# Patient Record
Sex: FEMALE | Race: WHITE | HISPANIC OR LATINO
[De-identification: names, ages, dates, MRNs, and addresses within clinical notes are randomized per-mention and may not be internally consistent; named-entity substitution may affect disease eponyms.]

---

## 2017-03-30 ENCOUNTER — TRANSCRIPTION ENCOUNTER (OUTPATIENT)
Age: 42
End: 2017-03-30

## 2017-11-26 ENCOUNTER — TRANSCRIPTION ENCOUNTER (OUTPATIENT)
Age: 42
End: 2017-11-26

## 2017-12-18 ENCOUNTER — APPOINTMENT (OUTPATIENT)
Dept: SPINE | Facility: CLINIC | Age: 42
End: 2017-12-18
Payer: COMMERCIAL

## 2017-12-18 VITALS
BODY MASS INDEX: 27.32 KG/M2 | DIASTOLIC BLOOD PRESSURE: 80 MMHG | SYSTOLIC BLOOD PRESSURE: 119 MMHG | HEART RATE: 62 BPM | HEIGHT: 66 IN | WEIGHT: 170 LBS

## 2017-12-18 DIAGNOSIS — Z78.9 OTHER SPECIFIED HEALTH STATUS: ICD-10-CM

## 2017-12-18 DIAGNOSIS — Z87.2 PERSONAL HISTORY OF DISEASES OF THE SKIN AND SUBCUTANEOUS TISSUE: ICD-10-CM

## 2017-12-18 DIAGNOSIS — M54.5 LOW BACK PAIN: ICD-10-CM

## 2017-12-18 DIAGNOSIS — Z87.09 PERSONAL HISTORY OF OTHER DISEASES OF THE RESPIRATORY SYSTEM: ICD-10-CM

## 2017-12-18 DIAGNOSIS — Z87.19 PERSONAL HISTORY OF OTHER DISEASES OF THE DIGESTIVE SYSTEM: ICD-10-CM

## 2017-12-18 PROCEDURE — 99203 OFFICE O/P NEW LOW 30 MIN: CPT

## 2018-01-11 ENCOUNTER — TRANSCRIPTION ENCOUNTER (OUTPATIENT)
Age: 43
End: 2018-01-11

## 2018-05-19 ENCOUNTER — TRANSCRIPTION ENCOUNTER (OUTPATIENT)
Age: 43
End: 2018-05-19

## 2018-11-07 ENCOUNTER — TRANSCRIPTION ENCOUNTER (OUTPATIENT)
Age: 43
End: 2018-11-07

## 2024-02-07 ENCOUNTER — NON-APPOINTMENT (OUTPATIENT)
Age: 49
End: 2024-02-07

## 2024-06-11 PROBLEM — Z00.00 ENCOUNTER FOR PREVENTIVE HEALTH EXAMINATION: Status: ACTIVE | Noted: 2024-06-11

## 2024-10-02 ENCOUNTER — APPOINTMENT (OUTPATIENT)
Dept: ENDOCRINOLOGY | Facility: CLINIC | Age: 49
End: 2024-10-02

## 2025-02-05 ENCOUNTER — INPATIENT (INPATIENT)
Facility: HOSPITAL | Age: 50
LOS: 3 days | Discharge: HOME | DRG: 863 | End: 2025-02-09
Attending: STUDENT IN AN ORGANIZED HEALTH CARE EDUCATION/TRAINING PROGRAM | Admitting: STUDENT IN AN ORGANIZED HEALTH CARE EDUCATION/TRAINING PROGRAM
Payer: COMMERCIAL

## 2025-02-05 VITALS
HEART RATE: 95 BPM | WEIGHT: 173.06 LBS | SYSTOLIC BLOOD PRESSURE: 117 MMHG | HEIGHT: 66 IN | OXYGEN SATURATION: 98 % | DIASTOLIC BLOOD PRESSURE: 60 MMHG | RESPIRATION RATE: 20 BRPM | TEMPERATURE: 98 F

## 2025-02-05 DIAGNOSIS — N61.1 ABSCESS OF THE BREAST AND NIPPLE: ICD-10-CM

## 2025-02-05 LAB
ALBUMIN SERPL ELPH-MCNC: 4.1 G/DL — SIGNIFICANT CHANGE UP (ref 3.3–5)
ALP SERPL-CCNC: 96 U/L — SIGNIFICANT CHANGE UP (ref 40–120)
ALT FLD-CCNC: 12 U/L — SIGNIFICANT CHANGE UP (ref 10–45)
ANION GAP SERPL CALC-SCNC: 12 MMOL/L — SIGNIFICANT CHANGE UP (ref 5–17)
AST SERPL-CCNC: 18 U/L — SIGNIFICANT CHANGE UP (ref 10–40)
BASOPHILS # BLD AUTO: 0.03 K/UL — SIGNIFICANT CHANGE UP (ref 0–0.2)
BASOPHILS NFR BLD AUTO: 0.5 % — SIGNIFICANT CHANGE UP (ref 0–2)
BILIRUB SERPL-MCNC: 0.2 MG/DL — SIGNIFICANT CHANGE UP (ref 0.2–1.2)
BUN SERPL-MCNC: 8 MG/DL — SIGNIFICANT CHANGE UP (ref 7–23)
CALCIUM SERPL-MCNC: 9.7 MG/DL — SIGNIFICANT CHANGE UP (ref 8.4–10.5)
CHLORIDE SERPL-SCNC: 100 MMOL/L — SIGNIFICANT CHANGE UP (ref 96–108)
CO2 SERPL-SCNC: 25 MMOL/L — SIGNIFICANT CHANGE UP (ref 22–31)
CREAT SERPL-MCNC: 0.55 MG/DL — SIGNIFICANT CHANGE UP (ref 0.5–1.3)
EGFR: 112 ML/MIN/1.73M2 — SIGNIFICANT CHANGE UP
EOSINOPHIL # BLD AUTO: 0.18 K/UL — SIGNIFICANT CHANGE UP (ref 0–0.5)
EOSINOPHIL NFR BLD AUTO: 3.1 % — SIGNIFICANT CHANGE UP (ref 0–6)
GLUCOSE SERPL-MCNC: 103 MG/DL — HIGH (ref 70–99)
HCT VFR BLD CALC: 39.2 % — SIGNIFICANT CHANGE UP (ref 34.5–45)
HGB BLD-MCNC: 12.2 G/DL — SIGNIFICANT CHANGE UP (ref 11.5–15.5)
IMM GRANULOCYTES NFR BLD AUTO: 0.3 % — SIGNIFICANT CHANGE UP (ref 0–0.9)
LYMPHOCYTES # BLD AUTO: 1.74 K/UL — SIGNIFICANT CHANGE UP (ref 1–3.3)
LYMPHOCYTES # BLD AUTO: 30.2 % — SIGNIFICANT CHANGE UP (ref 13–44)
MCHC RBC-ENTMCNC: 28.4 PG — SIGNIFICANT CHANGE UP (ref 27–34)
MCHC RBC-ENTMCNC: 31.1 G/DL — LOW (ref 32–36)
MCV RBC AUTO: 91.2 FL — SIGNIFICANT CHANGE UP (ref 80–100)
MONOCYTES # BLD AUTO: 0.71 K/UL — SIGNIFICANT CHANGE UP (ref 0–0.9)
MONOCYTES NFR BLD AUTO: 12.3 % — SIGNIFICANT CHANGE UP (ref 2–14)
NEUTROPHILS # BLD AUTO: 3.08 K/UL — SIGNIFICANT CHANGE UP (ref 1.8–7.4)
NEUTROPHILS NFR BLD AUTO: 53.6 % — SIGNIFICANT CHANGE UP (ref 43–77)
NRBC # BLD: 0 /100 WBCS — SIGNIFICANT CHANGE UP (ref 0–0)
NRBC BLD-RTO: 0 /100 WBCS — SIGNIFICANT CHANGE UP (ref 0–0)
PLATELET # BLD AUTO: 423 K/UL — HIGH (ref 150–400)
POTASSIUM SERPL-MCNC: 4 MMOL/L — SIGNIFICANT CHANGE UP (ref 3.5–5.3)
POTASSIUM SERPL-SCNC: 4 MMOL/L — SIGNIFICANT CHANGE UP (ref 3.5–5.3)
PROT SERPL-MCNC: 7.5 G/DL — SIGNIFICANT CHANGE UP (ref 6–8.3)
RBC # BLD: 4.3 M/UL — SIGNIFICANT CHANGE UP (ref 3.8–5.2)
RBC # FLD: 13 % — SIGNIFICANT CHANGE UP (ref 10.3–14.5)
SODIUM SERPL-SCNC: 137 MMOL/L — SIGNIFICANT CHANGE UP (ref 135–145)
WBC # BLD: 5.76 K/UL — SIGNIFICANT CHANGE UP (ref 3.8–10.5)
WBC # FLD AUTO: 5.76 K/UL — SIGNIFICANT CHANGE UP (ref 3.8–10.5)

## 2025-02-05 PROCEDURE — 99223 1ST HOSP IP/OBS HIGH 75: CPT

## 2025-02-05 PROCEDURE — 99285 EMERGENCY DEPT VISIT HI MDM: CPT

## 2025-02-05 PROCEDURE — 76642 ULTRASOUND BREAST LIMITED: CPT | Mod: 26,50

## 2025-02-05 RX ORDER — PIPERACILLIN SODIUM AND TAZOBACTAM SODIUM 2; 250 G/50ML; MG/50ML
3.38 INJECTION, POWDER, FOR SOLUTION INTRAVENOUS ONCE
Refills: 0 | Status: COMPLETED | OUTPATIENT
Start: 2025-02-05 | End: 2025-02-05

## 2025-02-05 RX ORDER — BACTERIOSTATIC SODIUM CHLORIDE 0.9 %
1000 VIAL (ML) INJECTION ONCE
Refills: 0 | Status: COMPLETED | OUTPATIENT
Start: 2025-02-05 | End: 2025-02-05

## 2025-02-05 RX ORDER — VANCOMYCIN HYDROCHLORIDE 50 MG/ML
1000 KIT ORAL ONCE
Refills: 0 | Status: COMPLETED | OUTPATIENT
Start: 2025-02-05 | End: 2025-02-05

## 2025-02-05 RX ORDER — KETOROLAC TROMETHAMINE 10 MG
15 TABLET ORAL ONCE
Refills: 0 | Status: DISCONTINUED | OUTPATIENT
Start: 2025-02-05 | End: 2025-02-05

## 2025-02-05 RX ADMIN — PIPERACILLIN SODIUM AND TAZOBACTAM SODIUM 200 GRAM(S): 2; 250 INJECTION, POWDER, FOR SOLUTION INTRAVENOUS at 21:29

## 2025-02-05 RX ADMIN — Medication 1000 MILLILITER(S): at 21:22

## 2025-02-05 RX ADMIN — VANCOMYCIN HYDROCHLORIDE 250 MILLIGRAM(S): KIT at 22:32

## 2025-02-05 RX ADMIN — Medication 15 MILLIGRAM(S): at 21:23

## 2025-02-05 NOTE — ED PROVIDER NOTE - SKIN, MLM
+ small blusters at 8 o clock position under left breast with induration and some small amount of granulation tissue.  Right breasts with similar finding but smaller

## 2025-02-05 NOTE — H&P ADULT - ASSESSMENT
49 year old female hx of ulcerative colitis in remission, psoriatic arthritis, chronic back pain from degenerative disc disease, spinal stenosis, s/p breast reduction and repair of rectus diasti in [place] on 12/19 presenting with concerns for wound dehiscence. Found to have a complex fluid collection in L breast c/f abscess. Pt admitted for further management.

## 2025-02-05 NOTE — H&P ADULT - NSHPREVIEWOFSYSTEMS_GEN_ALL_CORE
Review of Systems:   CONSTITUTIONAL: No fever, weight loss  EYES: No eye pain, visual disturbances, or discharge  ENMT:  No difficulty hearing, tinnitus, vertigo; No sinus or throat pain  RESPIRATORY: No SOB. No cough, wheezing, chills or hemoptysis  CARDIOVASCULAR: No chest pain, palpitations, dizziness, or leg swelling  GASTROINTESTINAL: No abdominal or epigastric pain. No nausea, vomiting, or hematemesis; No diarrhea or constipation. No melena or hematochezia.  GENITOURINARY: No dysuria, frequency, hematuria, or incontinence  NEUROLOGICAL: No headaches, memory loss, loss of strength, numbness, or tremors  SKIN: open wounds under L breast . Small wound under R breast  MUSCULOSKELETAL: No joint pain or swelling; No muscle, back pain  PSYCHIATRIC: No depression, anxiety, mood swings, or difficulty sleeping  HEME/LYMPH: No easy bruising, or bleeding gums

## 2025-02-05 NOTE — ED ADULT NURSE NOTE - OBJECTIVE STATEMENT
48YO female s/p mammoplasty 9 weeks ago in [place] comes to the ED with c/o discharge ar surgical site. Pt states noticing small blisters approx five days ago under left breast at surgical site with yellowish discharge. pt is tender at site and has a small opening. Site currently has no discharge. pt is A&OX4, respirations are even and nonlabored, radial pulses are strong and equal. Pt denies fevers, chills. n/v/d. Pt placed in position of comfort. Bed in lowest position, wheels locked, appropriate side rails raised. Pt denies needs at this time.

## 2025-02-05 NOTE — H&P ADULT - NSHPPHYSICALEXAM_GEN_ALL_CORE
Vital Signs Last 24 Hrs  T(C): 36.8 (06 Feb 2025 00:51), Max: 36.9 (05 Feb 2025 21:31)  T(F): 98.3 (06 Feb 2025 00:51), Max: 98.5 (05 Feb 2025 21:31)  HR: 82 (06 Feb 2025 00:51) (72 - 95)  BP: 104/67 (06 Feb 2025 00:51) (104/67 - 123/78)  BP(mean): 93 (05 Feb 2025 21:31) (93 - 93)  RR: 20 (06 Feb 2025 00:51) (18 - 20)  SpO2: 98% (06 Feb 2025 00:51) (98% - 98%)    Parameters below as of 06 Feb 2025 00:51  Patient On (Oxygen Delivery Method): room air        CONSTITUTIONAL: Well-groomed, in no apparent distress  EYES: No conjunctival or scleral injection, non-icteric; PERRLA and symmetric  ENMT: No external nasal lesions; nasal mucosa not inflamed; oral mucosa with moist membranes  RESPIRATORY: Breathing comfortably; lungs CTA without wheeze/rhonchi/rales  CARDIOVASCULAR: +S1S2, RRR, no M/G/R; no lower extremity edema  BREAST: L breast with single ulcerated lesion around 6:00 position, opening of surgical incision around 5:00 position.   GASTROINTESTINAL: No palpable masses or tenderness, +BS throughout, no rebound/guarding  MUSCULOSKELETAL:  No digital clubbing or cyanosis; no paraspinal tenderness; no malalignment of extremities  SKIN: No rashes or ulcers noted; no subcutaneous nodules or induration palpable  NEUROLOGIC: CN grossly intact; sensation intact in LEs b/l to light touch; normal strength and tone  PSYCHIATRIC: A+O x 3; mood and affect appropriate; appropriate insight and judgment

## 2025-02-05 NOTE — ED ADULT TRIAGE NOTE - CHIEF COMPLAINT QUOTE
Mammoplasty procedure 7 weeks ago. Pt noticed on saturday there were several blisters with discharge to surgical area. Surgeon advised pt to come to ED for evaluation. Pt denies fever chils

## 2025-02-05 NOTE — ED PROVIDER NOTE - CLINICAL SUMMARY MEDICAL DECISION MAKING FREE TEXT BOX
49 F s/p breast reduction sx performed on 12/19 presents to the ED w/ posisble wound dehiscence of the surgical site, pt states that 5 days ago, she developed blisters on the L lower breast and then developed on the R side, pt states sx started on saturday went to  and then was started on doxycycline and levquin, pt states she has been compliant w/ her medications, she has no fevers, no chills, no nausea no vomiting, she states that she has been applying mupricin to the area, pt states that she went to  and has been in contact w/ her surgeon, and sending photos, pt states that yesterday her surgeon saw the area and was concerned for wound dehiscence, pt reports no purulent drainage. on exam, pt has an area approx 1 cm on the lower L breast and a second area on the l lower breast approx 2 cm in size, at the 5 oclock position, pt w a 1 cm medial aspect on the R side approx w/ no purulent drainge, plan for sonogram and antibiotics and reassessment, will discuss w/ surgery, obtain ultrasound to assess for wound collection

## 2025-02-05 NOTE — H&P ADULT - PROBLEM SELECTOR PLAN 2
Hx of chronic pain from herniated disc, pinched nerve, spinal stenosis  - Tylenol and oxycodone prn for symptom management

## 2025-02-05 NOTE — H&P ADULT - HISTORY OF PRESENT ILLNESS
49 year old female s/p breast reduction and repair of rectus diasti in [place] on 12/19 presenting with concerns for wound dehiscence.  49 year old female hx of ulcerative colitis in remission, psoriatic arthritis, chronic back pain from degenerative disc disease, spinal stenosis, s/p breast reduction and repair of rectus diasti in [place] on 12/19 presenting with concerns for wound dehiscence. Pt went for breast reduction surgery to help her chronic back pain. Post-operatively for the first six weeks, pt reported no complications. Wounds appeared to be healing well. However around Friday 1/31, pt started experiencing some pain around incision site. On Saturday, pt took a shower after which she noted reopening or her surgical site with some drainage of clear yellowish fluid from her L breast. She also notes smaller opening in her R breast as well. Denies any bleeding, purulent discharge, fevers. She has pain and soreness in her breasts for which she takes tylenol. She has been in contact with her surgeon from [place] who recommended for her to come to ED for further evaluation. Pt also went to urgent care for evaluation and was prescribed doxycyline and levaquin for which she has been taking without significant improvement. In ED, pt was evaluated by Plastics and Surgery. Breast US showed complex fluid collection in L breast c/f abscess along with smaller collection in R breast. Pt admitted for further management.

## 2025-02-05 NOTE — ED PROVIDER NOTE - OBJECTIVE STATEMENT
49-year-old female status post mammoplasty 9 weeks ago in [place] presenting to the emergency department with likely dehiscence of her surgical site.  Patient reports that 5 days ago she noticed some small blisters more prominent on the left lower breast however present on the right as well.  Patient immediately was seen in urgent care started on doxycycline and Levaquin as well as mupirocin.  Patient has been in contact with her surgeon and been sending pictures every day.  Patient became concerned about the appearance of the rash today and recommended she come to the emergency department.  No fevers no chills had cultures performed at the urgent care on day 1of symptoms which displayed no growth according to her.  No other complaints at this time

## 2025-02-05 NOTE — H&P ADULT - NSICDXPASTMEDICALHX_GEN_ALL_CORE_FT
PAST MEDICAL HISTORY:  Chronic back pain     History of herniated intervertebral disc     Psoriatic arthritis     Ulcerative colitis

## 2025-02-05 NOTE — ED PROVIDER NOTE - PROGRESS NOTE DETAILS
Spoke with plastic surgeon David Arreola went over case and likely appearance of a left-sided breast abscess.  He does not believe that there is any acute surgical intervention for him at this time and this would be better suited to be seen and evaluated by interventional radiology tomorrow.  Agrees with continuing IV antibiotics and he will come consult in the morning as well.  Julia

## 2025-02-05 NOTE — CONSULT NOTE ADULT - SUBJECTIVE AND OBJECTIVE BOX
*** SURGERY CONSULT NOTE    Consulting Team: Breast Surgery     Patient: KENTRELL MAY , 49y ([birth year])Female   MRN: [id number]  Location: [facility] ED  Visit: 02-05-25 Emergency  Date: 02-05-25 @ 19:41      HPI: 49F PMH UC (not taking immunosupp/immunologics), PSA, s/p recent breast reduction suergey and repair of rectus diasti in [place] 7 weeks ago now presenting to the ED with c/f wound dehiscence. Patient reports 5 days ago she noticed some small blisters along inframmamry incision. She was sending daily images to her surgeon in [place], developed a punctate portion of drainage along incision with area of firbinous tissue b/l. Patient went to urgent care and had incision cultured (reportedly negative) and was started on doxycycline and Levaquin as well as mupirocin. She continued to develop eythema and some pain along incision despite abx with an area c/f wound dehiscence so was prompted by OP surgeon to go to ED. Patient otherwise denies subjective f/c, CP, SOB, n/v, or liz pus. In the ED AVSS, labs showed no leukocytosis.       PAST MEDICAL HISTORY:      PAST SURGICAL HISTORY:      MEDICATIONS:      ALLERGIES:  No Known Allergies      VITALS & I/Os:  Vital Signs Last 24 Hrs  T(C): 36.6 (05 Feb 2025 15:16), Max: 36.6 (05 Feb 2025 15:16)  T(F): 97.8 (05 Feb 2025 15:16), Max: 97.8 (05 Feb 2025 15:16)  HR: 95 (05 Feb 2025 15:16) (95 - 95)  BP: 117/60 (05 Feb 2025 15:16) (117/60 - 117/60)  BP(mean): --  RR: 20 (05 Feb 2025 15:16) (20 - 20)  SpO2: 98% (05 Feb 2025 15:16) (98% - 98%)    Parameters below as of 05 Feb 2025 15:16  Patient On (Oxygen Delivery Method): room air        I&O's Summary      PHYSICAL EXAM:  General Appearance: no acute distress, NTND   Chest: airway intact, non-labored breathing  Breast: inframammary incisions with erythema, R incision with focal area of fibrinous tissue but no palpable fluctuance or TTP, L incision with two small areas of wound breakdown and overlying fibrinous tissue, no liz pus/crepitus or fluctuance    LABS:                        12.2   5.76  )-----------( 423      ( 05 Feb 2025 18:57 )             39.2     02-05    137  |  100  |  8   ----------------------------<  103[H]  4.0   |  25  |  0.55    Ca    9.7      05 Feb 2025 18:57    TPro  7.5  /  Alb  4.1  /  TBili  0.2  /  DBili  x   /  AST  18  /  ALT  12  /  AlkPhos  96  02-05    Lactate:              Urinalysis Basic - ( 05 Feb 2025 18:57 )    Color: x / Appearance: x / SG: x / pH: x  Gluc: 103 mg/dL / Ketone: x  / Bili: x / Urobili: x   Blood: x / Protein: x / Nitrite: x   Leuk Esterase: x / RBC: x / WBC x   Sq Epi: x / Non Sq Epi: x / Bacteria: x               *** SURGERY CONSULT NOTE    Consulting Team: Breast Surgery     Patient: KENTRELL MAY , 49y ([birth year])Female   MRN: [id number]  Location: [facility] ED  Visit: 02-05-25 Emergency  Date: 02-05-25 @ 19:41      HPI: 49F PMH UC (not taking immunosupp/immunologics), PSA, s/p recent breast reduction surgery and repair of rectus diasti in [place] 7 weeks ago now presenting to the ED with c/f wound dehiscence. Patient reports 5 days ago she noticed some small blisters along inframammary incision. She was sending daily images to her surgeon in [place], developed a punctate portion of drainage along incision with area of fibrinous tissue b/l. Patient went to urgent care and had incision cultured (reportedly negative) and was started on doxycycline and Levaquin as well as mupirocin. She continued to develop erythema and some pain along incision despite abx with an area c/f wound dehiscence so was prompted by OP surgeon to go to ED. Patient otherwise denies subjective f/c, CP, SOB, n/v, or liz pus. In the ED AVSS, labs showed no leukocytosis.       PAST MEDICAL HISTORY:      PAST SURGICAL HISTORY:      MEDICATIONS:      ALLERGIES:  No Known Allergies      VITALS & I/Os:  Vital Signs Last 24 Hrs  T(C): 36.6 (05 Feb 2025 15:16), Max: 36.6 (05 Feb 2025 15:16)  T(F): 97.8 (05 Feb 2025 15:16), Max: 97.8 (05 Feb 2025 15:16)  HR: 95 (05 Feb 2025 15:16) (95 - 95)  BP: 117/60 (05 Feb 2025 15:16) (117/60 - 117/60)  BP(mean): --  RR: 20 (05 Feb 2025 15:16) (20 - 20)  SpO2: 98% (05 Feb 2025 15:16) (98% - 98%)    Parameters below as of 05 Feb 2025 15:16  Patient On (Oxygen Delivery Method): room air        I&O's Summary      PHYSICAL EXAM:  General Appearance: no acute distress, NTND   Chest: airway intact, non-labored breathing  Breast: inframammary incisions with erythema, R incision with focal area of fibrinous tissue but no palpable fluctuance or TTP, L incision with two small areas of wound breakdown and overlying fibrinous tissue, no liz pus/crepitus or fluctuance    LABS:                        12.2   5.76  )-----------( 423      ( 05 Feb 2025 18:57 )             39.2     02-05    137  |  100  |  8   ----------------------------<  103[H]  4.0   |  25  |  0.55    Ca    9.7      05 Feb 2025 18:57    TPro  7.5  /  Alb  4.1  /  TBili  0.2  /  DBili  x   /  AST  18  /  ALT  12  /  AlkPhos  96  02-05    Lactate:              Urinalysis Basic - ( 05 Feb 2025 18:57 )    Color: x / Appearance: x / SG: x / pH: x  Gluc: 103 mg/dL / Ketone: x  / Bili: x / Urobili: x   Blood: x / Protein: x / Nitrite: x   Leuk Esterase: x / RBC: x / WBC x   Sq Epi: x / Non Sq Epi: x / Bacteria: x

## 2025-02-05 NOTE — H&P ADULT - NSHPLABSRESULTS_GEN_ALL_CORE
02-05    137  |  100  |  8   ----------------------------<  103[H]  4.0   |  25  |  0.55    Ca    9.7      05 Feb 2025 18:57    TPro  7.5  /  Alb  4.1  /  TBili  0.2  /  DBili  x   /  AST  18  /  ALT  12  /  AlkPhos  96  02-05                    Urinalysis Basic - ( 05 Feb 2025 18:57 )    Color: x / Appearance: x / SG: x / pH: x  Gluc: 103 mg/dL / Ketone: x  / Bili: x / Urobili: x   Blood: x / Protein: x / Nitrite: x   Leuk Esterase: x / RBC: x / WBC x   Sq Epi: x / Non Sq Epi: x / Bacteria: x                              12.2   5.76  )-----------( 423      ( 05 Feb 2025 18:57 )             39.2 02-05    137  |  100  |  8   ----------------------------<  103[H]  4.0   |  25  |  0.55    Ca    9.7      05 Feb 2025 18:57    TPro  7.5  /  Alb  4.1  /  TBili  0.2  /  DBili  x   /  AST  18  /  ALT  12  /  AlkPhos  96  02-05                  Urinalysis Basic - ( 05 Feb 2025 18:57 )    Color: x / Appearance: x / SG: x / pH: x  Gluc: 103 mg/dL / Ketone: x  / Bili: x / Urobili: x   Blood: x / Protein: x / Nitrite: x   Leuk Esterase: x / RBC: x / WBC x   Sq Epi: x / Non Sq Epi: x / Bacteria: x                            12.2   5.76  )-----------( 423      ( 05 Feb 2025 18:57 )             39.2    IMAGING    < from: US Breast Limited, Bilateral (02.05.25 @ 20:24) >    IMPRESSION:  Complex fluid collection with surrounding vascularity of the left breast   5:00 position along the incision site, suspicious for abscess.    No sonographic abnormality in the region of the left lower outer   breast/underarm region.    Small subcentimeter mildly complex fluid collection of the right lower   outer breast/underarm area without associated vascularity which may   represent small postoperative hematoma/seroma or abscess.    If symptoms do not resolve clinically, additional imaging in a dedicated   breast imaging center should be performed .    < end of copied text >

## 2025-02-05 NOTE — H&P ADULT - PROBLEM SELECTOR PLAN 1
Complex fluid collection noted on breast US, larger on L. Will need to rule out infection.   - Plastics and surgery consult recs appreciated  - IR consult for possible drainage of abscess  - Send fluid for culture  - Will continue vancomycin and zosyn for empiric coverage  - Monitor for fevers  - Wound care

## 2025-02-05 NOTE — CONSULT NOTE ADULT - ASSESSMENT
49F PMH UC (not taking immunosupp/immunologics), PSA, s/p recent breast reduction suergey and repair of rectus diasti in [place] 7 weeks ago now presenting to the ED with c/f wound dehiscence.     Rec:  - Defer to plastics for wound recs, patient had elective b/l breast reduction surgery with OP plastics  - Agree with abx  - Can repeat wound culture and obtain b/l breast US  - Discussed with Dr. Barney Munoz Surgery [id number]  Jonathan Solomon MD (PGY2)  49F PMH UC (not taking immunosupp/immunologics), PSA, s/p recent breast reduction surgery and repair of rectus diasti in [place] 7 weeks ago now presenting to the ED with c/f wound dehiscence.     Rec:  - Defer to plastics for wound recs, patient had elective b/l breast reduction surgery with OP plastics  - Agree with abx  - Can repeat wound culture and obtain b/l breast US  - Discussed with Dr. Barney Munoz Surgery [id number]  Jonathan Solomon MD (PGY2)  49F PMH UC (not taking immunosupp/immunologics), PSA, s/p recent breast reduction surgery and repair of rectus diasti in [place] 7 weeks ago now presenting to the ED with c/f wound dehiscence.     Rec:  - Defer to plastics for wound recs, patient had elective b/l breast reduction surgery with OP plastics  - Agree with abx  - Can repeat wound culture and obtain b/l breast US  - breast surgery to sign off  - Discussed with Dr. Barney Munoz Surgery [id number]  Jonathan Solomon MD (PGY2)

## 2025-02-06 DIAGNOSIS — N61.1 ABSCESS OF THE BREAST AND NIPPLE: ICD-10-CM

## 2025-02-06 DIAGNOSIS — Z98.890 OTHER SPECIFIED POSTPROCEDURAL STATES: Chronic | ICD-10-CM

## 2025-02-06 DIAGNOSIS — Z29.9 ENCOUNTER FOR PROPHYLACTIC MEASURES, UNSPECIFIED: ICD-10-CM

## 2025-02-06 LAB
GRAM STN FLD: SIGNIFICANT CHANGE UP
SPECIMEN SOURCE: SIGNIFICANT CHANGE UP

## 2025-02-06 PROCEDURE — G0545: CPT

## 2025-02-06 PROCEDURE — 99232 SBSQ HOSP IP/OBS MODERATE 35: CPT

## 2025-02-06 PROCEDURE — 99222 1ST HOSP IP/OBS MODERATE 55: CPT

## 2025-02-06 RX ORDER — OXYCODONE HYDROCHLORIDE 30 MG/1
5 TABLET ORAL EVERY 6 HOURS
Refills: 0 | Status: DISCONTINUED | OUTPATIENT
Start: 2025-02-06 | End: 2025-02-09

## 2025-02-06 RX ORDER — OXYCODONE HYDROCHLORIDE 30 MG/1
2.5 TABLET ORAL EVERY 6 HOURS
Refills: 0 | Status: DISCONTINUED | OUTPATIENT
Start: 2025-02-06 | End: 2025-02-09

## 2025-02-06 RX ORDER — ACETAMINOPHEN 160 MG/5ML
650 SUSPENSION ORAL EVERY 6 HOURS
Refills: 0 | Status: DISCONTINUED | OUTPATIENT
Start: 2025-02-06 | End: 2025-02-09

## 2025-02-06 RX ORDER — OXYCODONE HYDROCHLORIDE 30 MG/1
5 TABLET ORAL EVERY 8 HOURS
Refills: 0 | Status: DISCONTINUED | OUTPATIENT
Start: 2025-02-06 | End: 2025-02-06

## 2025-02-06 RX ORDER — OXYCODONE HYDROCHLORIDE 30 MG/1
2.5 TABLET ORAL EVERY 8 HOURS
Refills: 0 | Status: DISCONTINUED | OUTPATIENT
Start: 2025-02-06 | End: 2025-02-06

## 2025-02-06 RX ORDER — VANCOMYCIN HYDROCHLORIDE 50 MG/ML
1250 KIT ORAL EVERY 12 HOURS
Refills: 0 | Status: DISCONTINUED | OUTPATIENT
Start: 2025-02-06 | End: 2025-02-09

## 2025-02-06 RX ORDER — PIPERACILLIN SODIUM AND TAZOBACTAM SODIUM 2; 250 G/50ML; MG/50ML
3.38 INJECTION, POWDER, FOR SOLUTION INTRAVENOUS EVERY 8 HOURS
Refills: 0 | Status: DISCONTINUED | OUTPATIENT
Start: 2025-02-06 | End: 2025-02-09

## 2025-02-06 RX ADMIN — PIPERACILLIN SODIUM AND TAZOBACTAM SODIUM 25 GRAM(S): 2; 250 INJECTION, POWDER, FOR SOLUTION INTRAVENOUS at 17:14

## 2025-02-06 RX ADMIN — OXYCODONE HYDROCHLORIDE 5 MILLIGRAM(S): 30 TABLET ORAL at 11:13

## 2025-02-06 RX ADMIN — VANCOMYCIN HYDROCHLORIDE 166.67 MILLIGRAM(S): KIT at 12:33

## 2025-02-06 RX ADMIN — OXYCODONE HYDROCHLORIDE 5 MILLIGRAM(S): 30 TABLET ORAL at 10:13

## 2025-02-06 RX ADMIN — OXYCODONE HYDROCHLORIDE 5 MILLIGRAM(S): 30 TABLET ORAL at 22:55

## 2025-02-06 RX ADMIN — Medication 1 APPLICATION(S): at 20:52

## 2025-02-06 RX ADMIN — VANCOMYCIN HYDROCHLORIDE 166.67 MILLIGRAM(S): KIT at 23:51

## 2025-02-06 RX ADMIN — PIPERACILLIN SODIUM AND TAZOBACTAM SODIUM 25 GRAM(S): 2; 250 INJECTION, POWDER, FOR SOLUTION INTRAVENOUS at 06:11

## 2025-02-06 RX ADMIN — OXYCODONE HYDROCHLORIDE 2.5 MILLIGRAM(S): 30 TABLET ORAL at 03:11

## 2025-02-06 RX ADMIN — OXYCODONE HYDROCHLORIDE 5 MILLIGRAM(S): 30 TABLET ORAL at 21:55

## 2025-02-06 NOTE — PATIENT PROFILE ADULT - VISION (WITH CORRECTIVE LENSES IF THE PATIENT USUALLY WEARS THEM):
The patient is a 13y Female complaining of shortness of breath. Normal vision: sees adequately in most situations; can see medication labels, newsprint

## 2025-02-06 NOTE — CONSULT NOTE ADULT - ASSESSMENT
49 f with  ulcerative colitis in remission, psoriatic arthritis but not on any immunosuppresion, chronic back pain from degenerative disc disease, spinal stenosis, s/p breast reduction and repair of rectus diasti in [place] on 12/19, now p/w concerns for wound dehiscence on b/l breast and had some purulent drainage from them, was started on doxy and levo 4 days PTA  afebrile, no WBC  u/s showed Complex fluid collection with surrounding vascularity of the left breast 5:00 position along the incision site, suspicious for abscess. No sonographic abnormality in the region of the left lower outer breast/underarm region. Small subcentimeter mildly complex fluid collection of the right lower outer breast/underarm area without associated vascularity which may represent small postoperative hematoma/seroma or abscess.  wound swab cx negative      b/l wound dehiscence and purulent drainage 7 weeks after breast reduction, u/s with b/l small complex fluid collection suspicious for abscess  cx negative and also timing (7 weeks after) concerning for mycobacterial infection as well but was also started on doxy, levo 4 days PTA    * plan for IR aspiration today, please send bacterial and AFB cx  * c/w vanco and zosyn for now and will adjust as per cultures    The above assessment and plan was discussed with the primary team    Cherelle Hartmann MD  contact on teams  After 5pm and on weekends call [phone]

## 2025-02-06 NOTE — CONSULT NOTE ADULT - SUBJECTIVE AND OBJECTIVE BOX
Interventional Radiology    Evaluate for Procedure: Bilateral breast collection aspiration    HPI: 49 year old female hx of ulcerative colitis in remission, psoriatic arthritis, chronic back pain from degenerative disc disease, spinal stenosis, s/p breast reduction and repair of rectus diasti in [place] on  presenting with concerns for wound dehiscence. Pt went for breast reduction surgery to help her chronic back pain. Post-operatively for the first six weeks, pt reported no complications. Wounds appeared to be healing well. However around , pt started experiencing some pain around incision site. On Saturday, pt took a shower after which she noted reopening or her surgical site with some drainage of clear yellowish fluid from her L breast. She also notes smaller opening in her R breast as well. Denies any bleeding, purulent discharge, fevers. She has pain and soreness in her breasts for which she takes tylenol. She has been in contact with her surgeon from [place] who recommended for her to come to ED for further evaluation. Pt also went to urgent care for evaluation and was prescribed doxycyline and levaquin for which she has been taking without significant improvement. In ED, pt was evaluated by Plastics and Surgery. Breast US showed complex fluid collection in L breast c/f abscess along with smaller collection in R breast. Pt admitted for further management.     Allergies: No Known Allergies    Medications (Abx/Cardiac/Anticoagulation/Blood Products)  piperacillin/tazobactam IVPB..: 25 mL/Hr IV Intermittent ( @ 06:11)  piperacillin/tazobactam IVPB...: 200 mL/Hr IV Intermittent ( @ 21:29)  vancomycin  IVPB.: 250 mL/Hr IV Intermittent ( @ 22:32)    Data:  167.6  78.5  T(C): 36.7  HR: 77  BP: 102/66  RR: 20  SpO2: 98%    -WBC 5.76 / HgB 12.2 / Hct 39.2 / Plt 423  -Na 137 / Cl 100 / BUN 8 / Glucose 103  -K 4.0 / CO2 25 / Cr 0.55  -ALT 12 / Alk Phos 96 / T.Bili 0.2    Radiology: < from: US Breast Limited, Bilateral (25 @ 20:24) >    ACC: [id number] EXAM:  US BREAST LIMITED BI   ORDERED BY: DEREJE ANGEL     PROCEDURE DATE:  2025          INTERPRETATION:  CLINICAL INFORMATION: Mastitis. Rule out abscess.    TECHNIQUE: Limited sonographic evaluation of the bilateral breasts breast   was performed targeted to the area of pain in the 5:00 position of the   left breast as well as left lower outer breast/underarm area and right   lower outer breast/underarm. Evaluation was performed by a technologist.   This study wasperformed exclusively for the purpose of excluding the   presence of abscess in the clinical setting of mastitis.    FINDINGS: In the 5:00 position of the left breast at the site of incision   there is a 2.4 x 0.5 x 1.2 cm complex fluid collection with surrounding   increased vascularity suspicious for an abscess.    In the region of the left lower outer breast underarm area there is no   sonographic abnormality.    In the right lower outer breast/underarm. There is small complex fluid   collection measuring 0.7 x 0.3 x 0.6 cm. There is no associated increased   vascularity.    IMPRESSION:  Complex fluid collection with surrounding vascularity of the left breast   5:00 position along the incision site, suspicious for abscess.    No sonographic abnormality in the region of the left lower outer   breast/underarm region.    Small subcentimeter mildly complex fluid collection of the right lower   outer breast/underarm area without associated vascularity which may   represent small postoperative hematoma/seroma or abscess.    If symptoms do not resolve clinically, additional imaging in a dedicated   breast imaging center should be performed .    --- End of Report ---      MIKALA DIAZ M.D., Attending Radiologist  This document has been electronically signed. 2025  8:45PM    < end of copied text >      Assessment/Plan: 49 year old female hx of ulcerative colitis in remission, psoriatic arthritis, chronic back pain from degenerative disc disease, spinal stenosis, s/p breast reduction and repair of rectus diasti in [place] on  presenting with concerns for wound dehiscence.Breast US showed complex fluid collection in L breast c/f abscess along with smaller collection in R breast. Pt admitted for further management. IR consulted for B/L breast collection aspiration.     **IR consulted in progress, will review with IR attending**        Any questions or concerns regarding above please reach out to IR:   -Available on [employer] teams  -During working hours (7a-5p): call [phone]  -Emergent issues after 5pm: page: [phone]  -Non-emergent consults: Please place a [facility] order "IR Consult" with an appropriate callback number  -Scheduling questions: [phone]  -Clinic/Outpatient [phone]   Interventional Radiology    Evaluate for Procedure: Bilateral breast collection aspiration    HPI: 49 year old female hx of ulcerative colitis in remission, psoriatic arthritis, chronic back pain from degenerative disc disease, spinal stenosis, s/p breast reduction and repair of rectus diasti in [place] on  presenting with concerns for wound dehiscence. Pt went for breast reduction surgery to help her chronic back pain. Post-operatively for the first six weeks, pt reported no complications. Wounds appeared to be healing well. However around , pt started experiencing some pain around incision site. On Saturday, pt took a shower after which she noted reopening or her surgical site with some drainage of clear yellowish fluid from her L breast. She also notes smaller opening in her R breast as well. Denies any bleeding, purulent discharge, fevers. She has pain and soreness in her breasts for which she takes tylenol. She has been in contact with her surgeon from [place] who recommended for her to come to ED for further evaluation. Pt also went to urgent care for evaluation and was prescribed doxycyline and levaquin for which she has been taking without significant improvement. In ED, pt was evaluated by Plastics and Surgery. Breast US showed complex fluid collection in L breast c/f abscess along with smaller collection in R breast. Pt admitted for further management.     Allergies: No Known Allergies    Medications (Abx/Cardiac/Anticoagulation/Blood Products)  piperacillin/tazobactam IVPB..: 25 mL/Hr IV Intermittent ( @ 06:11)  piperacillin/tazobactam IVPB...: 200 mL/Hr IV Intermittent ( @ 21:29)  vancomycin  IVPB.: 250 mL/Hr IV Intermittent ( @ 22:32)    Data:  167.6  78.5  T(C): 36.7  HR: 77  BP: 102/66  RR: 20  SpO2: 98%    -WBC 5.76 / HgB 12.2 / Hct 39.2 / Plt 423  -Na 137 / Cl 100 / BUN 8 / Glucose 103  -K 4.0 / CO2 25 / Cr 0.55  -ALT 12 / Alk Phos 96 / T.Bili 0.2    Radiology: < from: US Breast Limited, Bilateral (25 @ 20:24) >    ACC: [id number] EXAM:  US BREAST LIMITED BI   ORDERED BY: DEREJE ANGEL     PROCEDURE DATE:  2025          INTERPRETATION:  CLINICAL INFORMATION: Mastitis. Rule out abscess.    TECHNIQUE: Limited sonographic evaluation of the bilateral breasts breast   was performed targeted to the area of pain in the 5:00 position of the   left breast as well as left lower outer breast/underarm area and right   lower outer breast/underarm. Evaluation was performed by a technologist.   This study wasperformed exclusively for the purpose of excluding the   presence of abscess in the clinical setting of mastitis.    FINDINGS: In the 5:00 position of the left breast at the site of incision   there is a 2.4 x 0.5 x 1.2 cm complex fluid collection with surrounding   increased vascularity suspicious for an abscess.    In the region of the left lower outer breast underarm area there is no   sonographic abnormality.    In the right lower outer breast/underarm. There is small complex fluid   collection measuring 0.7 x 0.3 x 0.6 cm. There is no associated increased   vascularity.    IMPRESSION:  Complex fluid collection with surrounding vascularity of the left breast   5:00 position along the incision site, suspicious for abscess.    No sonographic abnormality in the region of the left lower outer   breast/underarm region.    Small subcentimeter mildly complex fluid collection of the right lower   outer breast/underarm area without associated vascularity which may   represent small postoperative hematoma/seroma or abscess.    If symptoms do not resolve clinically, additional imaging in a dedicated   breast imaging center should be performed .    --- End of Report ---      MIKALA DIAZ M.D., Attending Radiologist  This document has been electronically signed. 2025  8:45PM    < end of copied text >      Assessment/Plan: 49 year old female hx of ulcerative colitis in remission, psoriatic arthritis, chronic back pain from degenerative disc disease, spinal stenosis, s/p breast reduction and repair of rectus diasti in [place] on  presenting with concerns for wound dehiscence. Breast US showed complex fluid collection in L breast c/f abscess along with smaller collection in R breast. Pt admitted for further management. IR consulted for B/L breast collection aspiration.       - case reviewed and approved for left breast aspiration under local anesthesia for tomorrow . Right collection not amenable for drainage as too small.   - please place IR procedure order under  MD [facility]  - STAT labs in AM (cbc,coags, bmp, maintain activeT&S)  - Pt currently not on AC, if plans to start, please contact IR to coordinate holding pre-procedure/resumption post-procedure.   - No need to be NPO as will be done under local anesthesia     Any questions or concerns regarding above please reach out to IR:   -Available on [employer] teams  -During working hours (7a-5p): call [phone]  -Emergent issues after 5pm: page: [phone]  -Non-emergent consults: Please place a [facility] order "IR Consult" with an appropriate callback number  -Scheduling questions: [phone]  -Clinic/Outpatient [phone]

## 2025-02-06 NOTE — PATIENT PROFILE ADULT - STATED REASON FOR ADMISSION
-- Message is from the [facility]--    Reason for Call: PATIENT IS RETURNING A CALL FROM THE OFFICE,PLEASE CALL  [phone]    Caller Information       Type Contact Phone    04/24/2019 03:28 PM Phone (Incoming) Marie Biswas (Self) [phone] (H)          Alternative phone number:     Turnaround time given to caller:   \"This message will be sent to [state Provider's name]. The clinical team will fulfill your request as soon as they review your message.\"     Abscess on breast

## 2025-02-06 NOTE — CONSULT NOTE ADULT - SUBJECTIVE AND OBJECTIVE BOX
HPI:  49 year old female hx of ulcerative colitis in remission, psoriatic arthritis, chronic back pain from degenerative disc disease, spinal stenosis, s/p breast reduction and repair of rectus diasti in [place] on 12/19 presenting with concerns for wound dehiscence. Pt went for breast reduction surgery to help her chronic back pain. Post-operatively for the first six weeks, pt reported no complications. Wounds appeared to be healing well. However around Friday 1/31, pt started experiencing some pain around incision site. On Saturday, pt took a shower after which she noted reopening or her surgical site with some drainage of clear yellowish fluid from her L breast. She also notes smaller opening in her R breast as well. Denies any bleeding, purulent discharge, fevers. She has pain and soreness in her breasts for which she takes tylenol. She has been in contact with her surgeon from [place] who recommended for her to come to ED for further evaluation. Pt also went to urgent care for evaluation and was prescribed doxycyline and levaquin for which she has been taking without significant improvement. In ED, pt was evaluated by Plastics and Surgery. Breast US showed complex fluid collection in L breast c/f abscess along with smaller collection in R breast. Pt admitted for further management.  (05 Feb 2025 23:55)      PAST MEDICAL & SURGICAL HISTORY:  Ulcerative colitis      Psoriatic arthritis      Chronic back pain      History of herniated intervertebral disc      Status post breast reduction          Allergies    No Known Allergies    Intolerances        ANTIMICROBIALS:  piperacillin/tazobactam IVPB.. 3.375 every 8 hours  vancomycin  IVPB 1250 every 12 hours      OTHER MEDS:  acetaminophen     Tablet .. 650 milliGRAM(s) Oral every 6 hours PRN  bacitracin   Ointment 1 Application(s) Topical daily  oxyCODONE    IR 2.5 milliGRAM(s) Oral every 6 hours PRN  oxyCODONE    IR 5 milliGRAM(s) Oral every 6 hours PRN      SOCIAL HISTORY:  , lives with family  no smoking, alcohol or drug abuse  had her surgery in [place] and came back here recently    FAMILY HISTORY:  FHx: colon cancer        ROS:  Unobtainable because:   All other systems negative     Constitutional: no fever, no chills, no weight loss, no night sweats  Eye: no eye pain, no redness, no vision changes  ENT:  no sore throat, no rhinorrhea  Cardiovascular:  no chest pain, no palpitation  Respiratory:  no SOB, no cough  GI:  no abd pain, no vomiting, no diarrhea  urinary: no dysuria, no hematuria, no flank pain  : no vaginal discharge or bleeding  musculoskeletal:  no joint pain, no joint swelling  skin:  no rash  neurology:  no headache, no seizure, no change in mental status  psych: no anxiety, no depression     Physical Exam:    General:    NAD, non toxic  Head: atraumatic, normocephalic  Eyes: normal sclera and conjunctiva  ENT:   no oropharyngeal lesions, no LAD, neck supple  Cardio:    regular S1,S2  Respiratory:   clear b/l, no wheezing  abd:   soft, BS +, not tender  :     no CVAT, no suprapubic tenderness, no mariano  Musculoskeletal : no joint swelling, no edema  Skin:    small opening on breast incision, no purulence now, L wound slightly bigger  vascular: no phlebitis  Neurologic:     no focal deficits  psych: normal affect      Drug Dosing Weight  Height (cm): 167.6 (06 Feb 2025 08:41)  Weight (kg): 78.5 (05 Feb 2025 15:16)  BMI (kg/m2): 27.9 (06 Feb 2025 08:41)  BSA (m2): 1.88 (06 Feb 2025 08:41)    Vital Signs Last 24 Hrs  T(F): 98 (02-06-25 @ 08:41), Max: 98.5 (02-05-25 @ 21:31)    Vital Signs Last 24 Hrs  HR: 88 (02-06-25 @ 08:41) (72 - 95)  BP: 98/63 (02-06-25 @ 08:41) (98/63 - 123/78)  RR: 18 (02-06-25 @ 08:41)  SpO2: 96% (02-06-25 @ 08:41) (96% - 98%)  Wt(kg): --                          12.2   5.76  )-----------( 423      ( 05 Feb 2025 18:57 )             39.2       02-05    137  |  100  |  8   ----------------------------<  103[H]  4.0   |  25  |  0.55    Ca    9.7      05 Feb 2025 18:57    TPro  7.5  /  Alb  4.1  /  TBili  0.2  /  DBili  x   /  AST  18  /  ALT  12  /  AlkPhos  96  02-05      Urinalysis Basic - ( 05 Feb 2025 18:57 )    Color: x / Appearance: x / SG: x / pH: x  Gluc: 103 mg/dL / Ketone: x  / Bili: x / Urobili: x   Blood: x / Protein: x / Nitrite: x   Leuk Esterase: x / RBC: x / WBC x   Sq Epi: x / Non Sq Epi: x / Bacteria: x        MICROBIOLOGY:  v  .Abscess  02-05-25 --  --    No polymorphonuclear cells seen per low power field  No organisms seen per oil power field                  RADIOLOGY:    Images independently visualized and reviewed personally,  findings as below  < from: US Breast Limited, Bilateral (02.05.25 @ 20:24) >  IMPRESSION:  Complex fluid collection with surrounding vascularity of the left breast   5:00 position along the incision site, suspicious for abscess.    No sonographic abnormality in the region of the left lower outer   breast/underarm region.    Small subcentimeter mildly complex fluid collection of the right lower   outer breast/underarm area without associated vascularity which may   represent small postoperative hematoma/seroma or abscess.    If symptoms do not resolve clinically, additional imaging in a dedicated   breast imaging center should be performed .      < end of copied text >

## 2025-02-07 LAB
ANION GAP SERPL CALC-SCNC: 13 MMOL/L — SIGNIFICANT CHANGE UP (ref 5–17)
APTT BLD: 30 SEC — SIGNIFICANT CHANGE UP (ref 24.5–35.6)
B PERT IGG+IGM PNL SER: ABNORMAL
B PERT IGG+IGM PNL SER: ABNORMAL
BLD GP AB SCN SERPL QL: NEGATIVE — SIGNIFICANT CHANGE UP
BUN SERPL-MCNC: 12 MG/DL — SIGNIFICANT CHANGE UP (ref 7–23)
CALCIUM SERPL-MCNC: 9.2 MG/DL — SIGNIFICANT CHANGE UP (ref 8.4–10.5)
CHLORIDE SERPL-SCNC: 101 MMOL/L — SIGNIFICANT CHANGE UP (ref 96–108)
CO2 SERPL-SCNC: 23 MMOL/L — SIGNIFICANT CHANGE UP (ref 22–31)
COLOR FLD: ABNORMAL
COLOR FLD: SIGNIFICANT CHANGE UP
CREAT SERPL-MCNC: 0.66 MG/DL — SIGNIFICANT CHANGE UP (ref 0.5–1.3)
EGFR: 107 ML/MIN/1.73M2 — SIGNIFICANT CHANGE UP
EOSINOPHIL # FLD: 1 % — SIGNIFICANT CHANGE UP
FLUID INTAKE SUBSTANCE CLASS: SIGNIFICANT CHANGE UP
FLUID INTAKE SUBSTANCE CLASS: SIGNIFICANT CHANGE UP
FOLATE+VIT B12 SERBLD-IMP: 1 % — SIGNIFICANT CHANGE UP
GLUCOSE SERPL-MCNC: 101 MG/DL — HIGH (ref 70–99)
HCT VFR BLD CALC: 34.1 % — LOW (ref 34.5–45)
HGB BLD-MCNC: 10.9 G/DL — LOW (ref 11.5–15.5)
INR BLD: 1.07 RATIO — SIGNIFICANT CHANGE UP (ref 0.85–1.16)
LYMPHOCYTES # FLD: 4 % — SIGNIFICANT CHANGE UP
LYMPHOCYTES # FLD: 4 % — SIGNIFICANT CHANGE UP
MCHC RBC-ENTMCNC: 29.4 PG — SIGNIFICANT CHANGE UP (ref 27–34)
MCHC RBC-ENTMCNC: 32 G/DL — SIGNIFICANT CHANGE UP (ref 32–36)
MCV RBC AUTO: 91.9 FL — SIGNIFICANT CHANGE UP (ref 80–100)
MONOS+MACROS # FLD: 12 % — SIGNIFICANT CHANGE UP
MONOS+MACROS # FLD: 9 % — SIGNIFICANT CHANGE UP
NEUTROPHILS-BODY FLUID: 82 % — SIGNIFICANT CHANGE UP
NEUTROPHILS-BODY FLUID: 87 % — SIGNIFICANT CHANGE UP
NRBC # BLD: 0 /100 WBCS — SIGNIFICANT CHANGE UP (ref 0–0)
NRBC BLD-RTO: 0 /100 WBCS — SIGNIFICANT CHANGE UP (ref 0–0)
PLATELET # BLD AUTO: 356 K/UL — SIGNIFICANT CHANGE UP (ref 150–400)
POTASSIUM SERPL-MCNC: 4.5 MMOL/L — SIGNIFICANT CHANGE UP (ref 3.5–5.3)
POTASSIUM SERPL-SCNC: 4.5 MMOL/L — SIGNIFICANT CHANGE UP (ref 3.5–5.3)
PROTHROM AB SERPL-ACNC: 12.3 SEC — SIGNIFICANT CHANGE UP (ref 9.9–13.4)
RBC # BLD: 3.71 M/UL — LOW (ref 3.8–5.2)
RBC # FLD: 13.1 % — SIGNIFICANT CHANGE UP (ref 10.3–14.5)
RCV VOL RI: 173 CELLS/UL — SIGNIFICANT CHANGE UP
RCV VOL RI: SIGNIFICANT CHANGE UP CELLS/UL
RH IG SCN BLD-IMP: POSITIVE — SIGNIFICANT CHANGE UP
SODIUM SERPL-SCNC: 137 MMOL/L — SIGNIFICANT CHANGE UP (ref 135–145)
TOTAL NUCLEATED CELL COUNT, BODY FLUID: 3550 CELLS/UL — SIGNIFICANT CHANGE UP
TOTAL NUCLEATED CELL COUNT, BODY FLUID: SIGNIFICANT CHANGE UP CELLS/UL
TUBE TYPE: SIGNIFICANT CHANGE UP
TUBE TYPE: SIGNIFICANT CHANGE UP
VANCOMYCIN TROUGH SERPL-MCNC: 8.7 UG/ML — LOW (ref 10–20)
WBC # BLD: 5.8 K/UL — SIGNIFICANT CHANGE UP (ref 3.8–10.5)
WBC # FLD AUTO: 5.8 K/UL — SIGNIFICANT CHANGE UP (ref 3.8–10.5)
WBC COUNT.: 3550 CELLS/UL — SIGNIFICANT CHANGE UP
WBC COUNT.: SIGNIFICANT CHANGE UP CELLS/UL

## 2025-02-07 PROCEDURE — 10160 PNXR ASPIR ABSC HMTMA BULLA: CPT | Mod: 59

## 2025-02-07 PROCEDURE — 76942 ECHO GUIDE FOR BIOPSY: CPT | Mod: 26

## 2025-02-07 PROCEDURE — 76642 ULTRASOUND BREAST LIMITED: CPT | Mod: 26,50

## 2025-02-07 PROCEDURE — G0545: CPT

## 2025-02-07 PROCEDURE — 99232 SBSQ HOSP IP/OBS MODERATE 35: CPT

## 2025-02-07 RX ORDER — ACETAMINOPHEN 160 MG/5ML
1000 SUSPENSION ORAL ONCE
Refills: 0 | Status: COMPLETED | OUTPATIENT
Start: 2025-02-07 | End: 2025-02-07

## 2025-02-07 RX ADMIN — PIPERACILLIN SODIUM AND TAZOBACTAM SODIUM 25 GRAM(S): 2; 250 INJECTION, POWDER, FOR SOLUTION INTRAVENOUS at 19:40

## 2025-02-07 RX ADMIN — OXYCODONE HYDROCHLORIDE 5 MILLIGRAM(S): 30 TABLET ORAL at 12:07

## 2025-02-07 RX ADMIN — PIPERACILLIN SODIUM AND TAZOBACTAM SODIUM 25 GRAM(S): 2; 250 INJECTION, POWDER, FOR SOLUTION INTRAVENOUS at 12:07

## 2025-02-07 RX ADMIN — ACETAMINOPHEN 1000 MILLIGRAM(S): 160 SUSPENSION ORAL at 20:30

## 2025-02-07 RX ADMIN — PIPERACILLIN SODIUM AND TAZOBACTAM SODIUM 25 GRAM(S): 2; 250 INJECTION, POWDER, FOR SOLUTION INTRAVENOUS at 00:55

## 2025-02-07 RX ADMIN — VANCOMYCIN HYDROCHLORIDE 166.67 MILLIGRAM(S): KIT at 10:55

## 2025-02-07 RX ADMIN — Medication 1 APPLICATION(S): at 20:55

## 2025-02-07 RX ADMIN — VANCOMYCIN HYDROCHLORIDE 166.67 MILLIGRAM(S): KIT at 23:46

## 2025-02-07 RX ADMIN — OXYCODONE HYDROCHLORIDE 5 MILLIGRAM(S): 30 TABLET ORAL at 13:00

## 2025-02-07 RX ADMIN — ACETAMINOPHEN 400 MILLIGRAM(S): 160 SUSPENSION ORAL at 19:39

## 2025-02-07 NOTE — PRE PROCEDURE NOTE - PRE PROCEDURE EVALUATION
Interventional Radiology    HPI: 49 year old female hx of ulcerative colitis in remission, psoriatic arthritis, chronic back pain from degenerative disc disease, spinal stenosis, s/p breast reduction and repair of rectus diasti in [place] on 12/19 presenting with concerns for wound dehiscence. Breast US showed complex fluid collection in L breast c/f abscess along with smaller collection in R breast. Pt admitted for further management. Presents to IR  for B/L breast collection aspiration.     Allergies: No Known Allergies    Medications (Abx/Cardiac/Anticoagulation/Blood Products)  piperacillin/tazobactam IVPB..: 25 mL/Hr IV Intermittent (02-07 @ 00:55)  piperacillin/tazobactam IVPB...: 200 mL/Hr IV Intermittent (02-05 @ 21:29)  vancomycin  IVPB: 166.67 mL/Hr IV Intermittent (02-06 @ 23:51)  vancomycin  IVPB.: 250 mL/Hr IV Intermittent (02-05 @ 22:32)    Data:    T(C): 36.4  HR: 100  BP: 122/86  RR: 18  SpO2: 95%    Exam  General: No acute distress  Chest: Non labored breathing  Abdomen: Non-distended  Extremities: No swelling, warm    -WBC 5.80 / HgB 10.9 / Hct 34.1 / Plt 356  -Na 137 / Cl 101 / BUN 12 / Glucose 101  -K 4.5 / CO2 23 / Cr 0.66  -ALT -- / Alk Phos -- / T.Bili --  -INR1.07    Imaging: reviewed    Plan: 49y Female presents for Bilateral breast aspiration.    -Risks/Benefits/alternatives explained with the patient and/or healthcare proxy and witnessed informed consent obtained.

## 2025-02-07 NOTE — PROCEDURE NOTE - PROCEDURE FINDINGS AND DETAILS
Successful bilateral breast collection aspiration. focused ultrasound of the breasts demonstrates small 1-2 cm heterogenous collections just beneath the surgical incision.   Scant purulent fluid from the left and approximately 1-2 cc of purulent fluid aspirated from the right breast.

## 2025-02-08 ENCOUNTER — TRANSCRIPTION ENCOUNTER (OUTPATIENT)
Age: 50
End: 2025-02-08

## 2025-02-08 LAB
ANION GAP SERPL CALC-SCNC: 14 MMOL/L — SIGNIFICANT CHANGE UP (ref 5–17)
APPEARANCE UR: CLEAR — SIGNIFICANT CHANGE UP
BACTERIA # UR AUTO: NEGATIVE /HPF — SIGNIFICANT CHANGE UP
BILIRUB UR-MCNC: NEGATIVE — SIGNIFICANT CHANGE UP
BUN SERPL-MCNC: 9 MG/DL — SIGNIFICANT CHANGE UP (ref 7–23)
CALCIUM SERPL-MCNC: 9.1 MG/DL — SIGNIFICANT CHANGE UP (ref 8.4–10.5)
CAST: 0 /LPF — SIGNIFICANT CHANGE UP (ref 0–4)
CHLORIDE SERPL-SCNC: 100 MMOL/L — SIGNIFICANT CHANGE UP (ref 96–108)
CO2 SERPL-SCNC: 23 MMOL/L — SIGNIFICANT CHANGE UP (ref 22–31)
COLOR SPEC: YELLOW — SIGNIFICANT CHANGE UP
CREAT SERPL-MCNC: 0.69 MG/DL — SIGNIFICANT CHANGE UP (ref 0.5–1.3)
DIFF PNL FLD: ABNORMAL
EGFR: 106 ML/MIN/1.73M2 — SIGNIFICANT CHANGE UP
GLUCOSE SERPL-MCNC: 103 MG/DL — HIGH (ref 70–99)
GLUCOSE UR QL: NEGATIVE MG/DL — SIGNIFICANT CHANGE UP
GRAM STN FLD: ABNORMAL
GRAM STN FLD: SIGNIFICANT CHANGE UP
GRAM STN FLD: SIGNIFICANT CHANGE UP
HCT VFR BLD CALC: 35.9 % — SIGNIFICANT CHANGE UP (ref 34.5–45)
HGB BLD-MCNC: 11.5 G/DL — SIGNIFICANT CHANGE UP (ref 11.5–15.5)
KETONES UR-MCNC: NEGATIVE MG/DL — SIGNIFICANT CHANGE UP
LEUKOCYTE ESTERASE UR-ACNC: NEGATIVE — SIGNIFICANT CHANGE UP
MCHC RBC-ENTMCNC: 29.3 PG — SIGNIFICANT CHANGE UP (ref 27–34)
MCHC RBC-ENTMCNC: 32 G/DL — SIGNIFICANT CHANGE UP (ref 32–36)
MCV RBC AUTO: 91.6 FL — SIGNIFICANT CHANGE UP (ref 80–100)
NIGHT BLUE STAIN TISS: SIGNIFICANT CHANGE UP
NIGHT BLUE STAIN TISS: SIGNIFICANT CHANGE UP
NITRITE UR-MCNC: NEGATIVE — SIGNIFICANT CHANGE UP
NRBC # BLD: 0 /100 WBCS — SIGNIFICANT CHANGE UP (ref 0–0)
NRBC BLD-RTO: 0 /100 WBCS — SIGNIFICANT CHANGE UP (ref 0–0)
PH UR: 5.5 — SIGNIFICANT CHANGE UP (ref 5–8)
PLATELET # BLD AUTO: 394 K/UL — SIGNIFICANT CHANGE UP (ref 150–400)
POTASSIUM SERPL-MCNC: 3.7 MMOL/L — SIGNIFICANT CHANGE UP (ref 3.5–5.3)
POTASSIUM SERPL-SCNC: 3.7 MMOL/L — SIGNIFICANT CHANGE UP (ref 3.5–5.3)
PROT UR-MCNC: SIGNIFICANT CHANGE UP MG/DL
RBC # BLD: 3.92 M/UL — SIGNIFICANT CHANGE UP (ref 3.8–5.2)
RBC # FLD: 13 % — SIGNIFICANT CHANGE UP (ref 10.3–14.5)
RBC CASTS # UR COMP ASSIST: 3 /HPF — SIGNIFICANT CHANGE UP (ref 0–4)
REVIEW: SIGNIFICANT CHANGE UP
SODIUM SERPL-SCNC: 137 MMOL/L — SIGNIFICANT CHANGE UP (ref 135–145)
SP GR SPEC: 1.01 — SIGNIFICANT CHANGE UP (ref 1–1.03)
SPECIMEN SOURCE: SIGNIFICANT CHANGE UP
SQUAMOUS # UR AUTO: 1 /HPF — SIGNIFICANT CHANGE UP (ref 0–5)
UROBILINOGEN FLD QL: 0.2 MG/DL — SIGNIFICANT CHANGE UP (ref 0.2–1)
WBC # BLD: 6.85 K/UL — SIGNIFICANT CHANGE UP (ref 3.8–10.5)
WBC # FLD AUTO: 6.85 K/UL — SIGNIFICANT CHANGE UP (ref 3.8–10.5)
WBC UR QL: 1 /HPF — SIGNIFICANT CHANGE UP (ref 0–5)

## 2025-02-08 PROCEDURE — 99232 SBSQ HOSP IP/OBS MODERATE 35: CPT

## 2025-02-08 RX ADMIN — OXYCODONE HYDROCHLORIDE 5 MILLIGRAM(S): 30 TABLET ORAL at 12:33

## 2025-02-08 RX ADMIN — VANCOMYCIN HYDROCHLORIDE 166.67 MILLIGRAM(S): KIT at 11:30

## 2025-02-08 RX ADMIN — PIPERACILLIN SODIUM AND TAZOBACTAM SODIUM 25 GRAM(S): 2; 250 INJECTION, POWDER, FOR SOLUTION INTRAVENOUS at 20:59

## 2025-02-08 RX ADMIN — OXYCODONE HYDROCHLORIDE 5 MILLIGRAM(S): 30 TABLET ORAL at 13:30

## 2025-02-08 RX ADMIN — OXYCODONE HYDROCHLORIDE 5 MILLIGRAM(S): 30 TABLET ORAL at 23:50

## 2025-02-08 RX ADMIN — Medication 1 APPLICATION(S): at 11:30

## 2025-02-08 RX ADMIN — PIPERACILLIN SODIUM AND TAZOBACTAM SODIUM 25 GRAM(S): 2; 250 INJECTION, POWDER, FOR SOLUTION INTRAVENOUS at 12:52

## 2025-02-08 RX ADMIN — PIPERACILLIN SODIUM AND TAZOBACTAM SODIUM 25 GRAM(S): 2; 250 INJECTION, POWDER, FOR SOLUTION INTRAVENOUS at 02:23

## 2025-02-08 NOTE — DISCHARGE NOTE PROVIDER - PROVIDER TOKENS
PROVIDER:[TOKEN:[[id number]],FOLLOWUP:[1 week]],PROVIDER:[TOKEN:[[id number]],FOLLOWUP:[1 week]] PROVIDER:[TOKEN:[[id number]],FOLLOWUP:[1 week]],PROVIDER:[TOKEN:[[id number]],FOLLOWUP:[1 week]],PROVIDER:[TOKEN:[[id number]],FOLLOWUP:[1 week]]

## 2025-02-08 NOTE — DISCHARGE NOTE PROVIDER - ATTENDING ATTESTATION STATEMENT
I have personally seen and examined the patient. I have collaborated with and supervised the [facility]: sees adequately in most situations; can see medication labels, newsprint

## 2025-02-08 NOTE — PROGRESS NOTE ADULT - PROBLEM SELECTOR PLAN 1
Complex fluid collection noted on breast US, larger on L. Will need to rule out infection.   - Plastics and surgery consult recs appreciated  - IR on board, planning for L breast aspiration under local anesthesia 2/7, R collection too small for drainage  - No organisms seen on wound culture taken in ED, pending IR cultures  - Will continue vancomycin and zosyn for empiric coverage, appreciate ID input   - Monitor for fevers  - Local wound care w/bacitracin as per plastics
Complex fluid collection noted on breast US, larger on L. Will need to rule out infection.   - Plastics and surgery consult recs appreciated  - IR on board, s/p b/l drainage ~ 1-2 cc aspiration from R breast, scant fluid aspiration from L sent for culture/gram stain  - No organisms seen on wound culture taken in ED, pending IR cultures as above  - Will continue vancomycin and zosyn for empiric coverage, appreciate ID input   - Local wound care w/bacitracin as per plastics  - Monitor for fevers
Complex fluid collection noted on breast US, larger on L. Will need to rule out infection.   - Plastics and surgery consult recs appreciated  - IR on board, s/p b/l drainage ~ 1-2 cc aspiration from R breast, scant fluid aspiration from L sent for culture/gram stain, thus far with no growth  - Initial wound cx taken in ED 2/5 now growing gram positive rods  - Will continue vancomycin and zosyn for empiric coverage, appreciate ID input  - Local wound care w/bacitracin as per plastics  - Monitor for fevers

## 2025-02-08 NOTE — DISCHARGE NOTE PROVIDER - NSDCMRMEDTOKEN_GEN_ALL_CORE_FT
Tylenol 500 mg oral tablet: 2 tab(s) orally as needed   amoxicillin-clavulanate 875 mg-125 mg oral tablet: 1 tab(s) orally every 12 hours  bacitracin 500 units/g topical ointment: Apply topically to affected area once a day apply small amount topically to affected area once a day  doxycycline monohydrate 50 mg oral capsule: 2 cap(s) orally every 12 hours  oxyCODONE 5 mg oral tablet: 1 tab(s) orally every 6 hours as needed for  pain Take 1 tablet every 6 hours as needed for pain, MDD: 20 mg  Tylenol 500 mg oral tablet: 2 tab(s) orally 4 times a day as needed for  pain as needed

## 2025-02-08 NOTE — DISCHARGE NOTE PROVIDER - NSDCFUADDAPPT_GEN_ALL_CORE_FT
APPTS ARE READY TO BE MADE: [X] YES    Best Family or Patient Contact (if needed):    Additional Information about above appointments (if needed):    1:   2:   3:     Other comments or requests:    APPTS ARE READY TO BE MADE: [X] YES    Best Family or Patient Contact (if needed):    Additional Information about above appointments (if needed):    1: ID - Dr. Hartmann within 1-2 weeks  2: Plastic surgery - Dr. Richey within 1 week  3: PCP within 1 week     Other comments or requests:    APPTS ARE READY TO BE MADE: [X] YES    Best Family or Patient Contact (if needed):    Additional Information about above appointments (if needed):    1: ID - Dr. Hartmann within 1-2 weeks  2: Plastic surgery - Dr. Richey within 1 week  3: PCP within 1 week     Other comments or requests:     Patient was outreached but did not answer. A voicemail was left for the patient to return our call.    Prior to outreaching the patient, it was visible that the patient has secured a follow up appointment which was not scheduled by our team. Patient is scheduled with Dr. Hartmann 3/3 9:30am [address] [place] [address] APPTS ARE READY TO BE MADE: [X] YES    Best Family or Patient Contact (if needed):    Additional Information about above appointments (if needed):    1: ID - Dr. Hartmann within 1-2 weeks  2: Plastic surgery - Dr. Richey within 1 week  3: PCP within 1 week     Other comments or requests:     Prior to outreaching the patient, it was visible that the patient has secured a follow up appointment which was not scheduled by our team. Patient is scheduled with Dr. Hartmann 3/3 9:30am [address]    Patient informed us they already have secured a follow up appointment for PCP with Dr. Vasquez, which is not visible on [employer].    Patient informed us they already have secured a follow up appointment for Plastic Surgery with Dr. Richey, which is not visible on [email or web address].

## 2025-02-08 NOTE — DISCHARGE NOTE PROVIDER - HOSPITAL COURSE
HPI:  49 year old female hx of ulcerative colitis in remission, psoriatic arthritis, chronic back pain from degenerative disc disease, spinal stenosis, s/p breast reduction and repair of rectus diasti in [place] on 12/19 presenting with concerns for wound dehiscence. Pt went for breast reduction surgery to help her chronic back pain. Post-operatively for the first six weeks, pt reported no complications. Wounds appeared to be healing well. However around Friday 1/31, pt started experiencing some pain around incision site. On Saturday, pt took a shower after which she noted reopening or her surgical site with some drainage of clear yellowish fluid from her L breast. She also notes smaller opening in her R breast as well. Denies any bleeding, purulent discharge, fevers. She has pain and soreness in her breasts for which she takes tylenol. She has been in contact with her surgeon from [place] who recommended for her to come to ED for further evaluation. Pt also went to urgent care for evaluation and was prescribed doxycyline and levaquin for which she has been taking without significant improvement. In ED, pt was evaluated by Plastics and Surgery. Breast US showed complex fluid collection in L breast c/f abscess along with smaller collection in R breast. Pt admitted for further management.  (05 Feb 2025 23:55)    Hospital Course:  presented with concerns for wound dehiscence. Patient was found to have a complex fluid collection in patient left breast, concerning for abscess. Patient breast ultrasound showed a complex fluid collection, larger on the left. IR performed bilateral breast drainage, aspirating 1-2 cc of fluid from right breast and scant fluid from left breast; cultures and gram stain were performed. Wound cultures taken in the emergency department showed Gram Positive Rods. Patient was started on vancomycin and Zosyn for empiric coverage pending IR cultures. ID consultation appreciated. Local wound care with telfa + bacitraicin was performed per plastics surgery recommendations.       Important Medication Changes and Reason:  - would follow the IR cx and adjust antibiotics but if pt needs to leave so can switch to PO augmentin 875 bid and doxy 100 bid for 10 days to follow the cultures    Active or Pending Issues Requiring Follow-up:  - plastics and ID    Advanced Directives:   [ ] Full code  [ ] DNR  [ ] Hospice    Discharge Diagnoses:         HPI:  49 year old female hx of ulcerative colitis in remission, psoriatic arthritis, chronic back pain from degenerative disc disease, spinal stenosis, s/p breast reduction and repair of rectus diasti in [place] on 12/19 presenting with concerns for wound dehiscence. Pt went for breast reduction surgery to help her chronic back pain. Post-operatively for the first six weeks, pt reported no complications. Wounds appeared to be healing well. However around Friday 1/31, pt started experiencing some pain around incision site. On Saturday, pt took a shower after which she noted reopening or her surgical site with some drainage of clear yellowish fluid from her L breast. She also notes smaller opening in her R breast as well. Denies any bleeding, purulent discharge, fevers. She has pain and soreness in her breasts for which she takes tylenol. She has been in contact with her surgeon from [place] who recommended for her to come to ED for further evaluation. Pt also went to urgent care for evaluation and was prescribed doxycyline and levaquin for which she has been taking without significant improvement. In ED, pt was evaluated by Plastics and Surgery. Breast US showed complex fluid collection in L breast c/f abscess along with smaller collection in R breast. Pt admitted for further management.  (05 Feb 2025 23:55)    Hospital Course:  presented with concerns for wound dehiscence. Patient was found to have a complex fluid collection in patient left breast, concerning for abscess. Patient breast ultrasound showed a complex fluid collection, larger on the left. IR performed bilateral breast drainage, aspirating 1-2 cc of fluid from right breast and scant fluid from left breast; cultures and gram stain were performed. Wound cultures taken in the emergency department showed Gram Positive Rods. Patient was started on vancomycin and Zosyn for empiric coverage pending IR cultures. ID consultation appreciated. Local wound care with telfa + bacitraicin was performed per plastics surgery recommendations.       Important Medication Changes and Reason:  - would follow the IR cx and adjust antibiotics but if pt needs to leave so can switch to PO augmentin 875 bid and doxy 100 bid for 10 days to follow the cultures    Active or Pending Issues Requiring Follow-up:  - follow with plastics and ID as outpatient     Advanced Directives:   [ ] Full code  [ ] DNR  [ ] Hospice    Discharge Diagnoses:         HPI:  49 year old female hx of ulcerative colitis in remission, psoriatic arthritis, chronic back pain from degenerative disc disease, spinal stenosis, s/p breast reduction and repair of rectus diasti in [place] on 12/19 presenting with concerns for wound dehiscence. Pt went for breast reduction surgery to help her chronic back pain. Post-operatively for the first six weeks, pt reported no complications. Wounds appeared to be healing well. However around Friday 1/31, pt started experiencing some pain around incision site. On Saturday, pt took a shower after which she noted reopening or her surgical site with some drainage of clear yellowish fluid from her L breast. She also notes smaller opening in her R breast as well. Denies any bleeding, purulent discharge, fevers. She has pain and soreness in her breasts for which she takes tylenol. She has been in contact with her surgeon from [place] who recommended for her to come to ED for further evaluation. Pt also went to urgent care for evaluation and was prescribed doxycyline and levaquin for which she has been taking without significant improvement. In ED, pt was evaluated by Plastics and Surgery. Breast US showed complex fluid collection in L breast c/f abscess along with smaller collection in R breast. Pt admitted for further management.  (05 Feb 2025 23:55)    Hospital Course:  presented with concerns for wound dehiscence. Patient was found to have a complex fluid collection in patient left breast, concerning for abscess. Patient breast ultrasound showed a complex fluid collection, larger on the left. IR performed bilateral breast drainage, aspirating 1-2 cc of fluid from right breast and scant fluid from left breast; cultures and gram stain were performed with no growth. Wound cultures taken in the emergency department showed Gram Positive Rods - corynebacterium. Patient was started on vancomycin and Zosyn for empiric coverage. Given new area of tenderness in upper left breast, a repeat Breast Ultrasound was done showing some increased size d/t likely bleeding vs seroma but no area of abscess in new area of tenderness. Patient was cleared by plastics for discharge with outpatient follow-up along with ID. Local wound care with telfa + adpatic + bacitraicin was performed per plastics surgery recommendations.       Important Medication Changes and Reason:  - Augmentin 875 mg BID x8 more days  - Doxycyline 100 mg BID x8 more days  - Bacitracin topically to affected area  - Oxyocodone 5 mg q6h as needed for pain x 3 days    Active or Pending Issues Requiring Follow-up:  - follow with plastics and ID as outpatient     Advanced Directives:   [X ] Full code  [ ] DNR  [ ] Hospice    Discharge Diagnoses:  Bilateral breast abscess

## 2025-02-08 NOTE — DISCHARGE NOTE PROVIDER - CARE PROVIDER_API CALL
Cherelle Hartmann)  Infectious Disease  [address] [place] [address]  Phone: [phone]  Fax: [phone]  Follow Up Time: 1 week    Keke Vasquez  [address] [address]  Phone: [phone]  Fax: [phone]  Follow Up Time: 1 week   Cherelle Hartmann)  Infectious Disease  [address] [place] [address]  Phone: [phone]  Fax: [phone]  Follow Up Time: 1 week    Keke Vasquez  [facility]  [address] [place] [address]  Phone: [phone]  Fax: [phone]  Follow Up Time: 1 week    David Richey  Plastic Surgery  [address] [place] [address]  Phone: [phone]  Fax: [phone]  Follow Up Time: 1 week

## 2025-02-08 NOTE — DISCHARGE NOTE PROVIDER - PROVIDER RX CONTACT NUMBER
Patient is here with her family to discuss recent testing.  Her TSH is higher at 13.2.  Microsomal antibody is negative.    Assessment/plan: Hypothyroid.  Most likely related to the amiodarone.  I discussed this with the cardiologist and for now the best recommendation is to stay on the amiodarone and will treat with levothyroxine.  Patient to start levothyroxine 0.05 mg p.o. daily and repeat TSH, free T4 in 1 month.  I instructed patient on how to take the medication.  All questions answered.   [phone]

## 2025-02-08 NOTE — DISCHARGE NOTE PROVIDER - CARE PROVIDERS DIRECT ADDRESSES
[email or web address]

## 2025-02-08 NOTE — DISCHARGE NOTE PROVIDER - NSDCCPCAREPLAN_GEN_ALL_CORE_FT
PRINCIPAL DISCHARGE DIAGNOSIS  Diagnosis: Breast abscess  Assessment and Plan of Treatment: Medications: You will likely be discharged on oral antibiotics. It is crucial to finish all antibiotics as prescribed, even if you feel better. Do not stop taking them without first talking to your doctor.  Wound Care: Continue local wound care with bacitracin as instructed by your physician or nurse. Keep the area clean and dry. Follow any specific dressing change instructions provided.  Monitoring: Monitor for any signs of infection, such as increasing redness, swelling, pain, warmth around the wound, or fever. Report any concerns to your physician immediately.  Follow-up: Schedule and attend a follow-up appointment with your surgeon/plastics specialist to monitor the healing of your surgical sites. Follow up with ID per routine       PRINCIPAL DISCHARGE DIAGNOSIS  Diagnosis: Breast abscess  Assessment and Plan of Treatment: Medications: You will likely be discharged on oral antibiotics. It is crucial to finish all antibiotics as prescribed, even if you feel better. Do not stop taking them without first talking to your doctor.  Wound Care: Continue local wound care with bacitracin as instructed by your physician or nurse. Keep the area clean and dry. Follow any specific dressing change instructions provided.  Monitoring: Monitor for any signs of infection, such as increasing redness, swelling, pain, warmth around the wound, or fever. Report any concerns to your physician immediately.  Follow-up: Schedule and attend a follow-up appointment with your surgeon/plastics specialist to monitor the healing of your surgical sites. Follow up with infectious disease within 1 week of discharge as well        PRINCIPAL DISCHARGE DIAGNOSIS  Diagnosis: Breast abscess  Assessment and Plan of Treatment: Medications: You will be discharged on oral antibiotics. It is crucial to finish all antibiotics as prescribed, even if you feel better. Do not stop taking them without first talking to your doctor.  Wound Care: Continue local wound care with bacitracin along with the adaptic and telfa dressing. Keep the area clean and dry. Change dressings daily.  Monitoring: Monitor for any signs of infection, such as increasing redness, swelling, pain, warmth around the wound, or fever. Report any concerns to your physician immediately.  Follow-up: Schedule and attend a follow-up appointment with your surgeon/plastics specialist to monitor the healing of your surgical sites. Follow up with infectious disease within 1 week of discharge as well

## 2025-02-08 NOTE — DISCHARGE NOTE PROVIDER - ATTENDING DISCHARGE PHYSICAL EXAMINATION:
CONSTITUTIONAL: Well-groomed, in no apparent distress  EYES: No conjunctival or scleral injection, non-icteric; PERRLA and symmetric  ENMT: No external nasal lesions; no pharyngeal injection or exudates, oral mucosa with moist membranes  RESPIRATORY: Breathing comfortably; lungs CTA without wheeze/rhonchi/rales  CARDIOVASCULAR: +S1S2, RRR, no M/G/R; pedal pulses full and symmetric; no lower extremity edema  GASTROINTESTINAL: No palpable masses or tenderness, +BS throughout, no rebound/guarding  MUSCULOSKELETAL: no digital clubbing or cyanosis  SKIN: s/p IR drainage/aspiration of b/l breasts, with overlying dressing in place. L upper breast with small area of induration, improved from day prior.   NEUROLOGIC: CN II-XII intact; sensation intact in LEs b/l to light touch  PSYCHIATRIC: A+O x 3; mood and affect appropriate; appropriate insight and judgment

## 2025-02-08 NOTE — DISCHARGE NOTE PROVIDER - NSDCFUSCHEDAPPT_GEN_ALL_CORE_FT
[facility]  [facility] [address] [facility]  Scheduled Appointment: 03/12/2025     Carley Moore  [facility]  ENDOCRIN [address]  Scheduled Appointment: 03/12/2025    Cherelle Hartmann  [facility]  INFDISEASE [address]  Scheduled Appointment: 04/03/2025

## 2025-02-08 NOTE — CHART NOTE - NSCHARTNOTEFT_GEN_A_CORE
CC: new abd numbness, red tinged urine, and bump on abdominal incision     HPI: 48 y/o F with recent abdominoplasty and breast reduction c/b infections who now complains of new numbness along the right side of her abdominal incision. Pt reports numbness began this evening. Denies pain or drainage from site. Pt states that she had mild numbness inferior to her umbilicus immediately after her surgery which persists, but this numbness along her incision is new. She also reports this new "firmness" along her abdominal incision. Pt states that she was getting lymphatic massages every week before her admission to hospital and notes that she was not been talking very often since her admission.   Pt also notes that she had two episodes of red tinged urine today. Pt reports small streaks of red while wiping and in the toilet bowl. Did not have a BM at at the time. Pt still menstruates and her LMP was two weeks ago. She denies abd pain, dysuria, vaginal itching, and vaginal discharge and rectal bleeding.    Vital Signs Last 24 Hrs  T(C): 37.2 (08 Feb 2025 21:19), Max: 37.2 (08 Feb 2025 21:19)  T(F): 98.9 (08 Feb 2025 21:19), Max: 98.9 (08 Feb 2025 21:19)  HR: 82 (08 Feb 2025 21:19) (64 - 82)  BP: 109/70 (08 Feb 2025 21:19) (103/70 - 109/70)  BP(mean): --  RR: 18 (08 Feb 2025 21:19) (17 - 18)  SpO2: 97% (08 Feb 2025 21:19) (95% - 97%)    Parameters below as of 08 Feb 2025 21:19  Patient On (Oxygen Delivery Method): room air          Labs:                          11.5   6.85  )-----------( 394      ( 08 Feb 2025 07:13 )             35.9     02-08    137  |  100  |  9   ----------------------------<  103[H]  3.7   |  23  |  0.69    Ca    9.1      08 Feb 2025 07:14                  Radiology:        Physical Exam:  General: WN/WD, NAD, nontoxic appearing  Neurology: A&Ox3, nonfocal, ALAS x 4  Head:  Normocephalic, atraumatic  Abdominal: Soft. Healing abdominal incision. No erythema or drainage from site. [place] and well approximated, with small areas of scabbing. Small area of induration along midline of incision. Non tender. Not warm to touch.   MSK: No edema,    Assessment & Plan:  HPI:  49 year old female hx of ulcerative colitis in remission, psoriatic arthritis, chronic back pain from degenerative disc disease, spinal stenosis, s/p breast reduction and repair of rectus diasti in [place] on 12/19 presenting with concerns for wound dehiscence. Wounds appeared to be healing well. However around Friday 1/31, pt started experiencing some pain around incision site. On Saturday, pt took a shower after which she noted reopening or her surgical site with some drainage of clear yellowish fluid from her L breast. She also notes smaller opening in her R breast as well.  She has been in contact with her surgeon from [place] who recommended for her to come to ED for further evaluation. Pt also went to urgent care for evaluation and was prescribed doxycyline and levaquin. In ED, pt was evaluated by Plastics and Surgery. Breast US showed complex fluid collection in L breast c/f abscess along with smaller collection in R breast. Pt admitted for further management.     Abdominal numbness and induration along incision site  > Discussed with overnight plastics resident who will follow up  [phone]  > Afebrile and pt on broad spectrum abx without abdominal tenderness or wound dehiscence, will continue to monitor for acute changes c/f infection  > UA  >Will endorse to primary team in AM        Aida Powell PA-C  Dept of Medicine  [id number] CC: new abd numbness, red tinged urine, and bump on abdominal incision     HPI: 50 y/o F with recent abdominoplasty and breast reduction c/b infections who now complains of new numbness along the right side of her abdominal incision. Pt reports numbness began this evening. Denies pain or drainage from site. Pt states that she had mild numbness inferior to her umbilicus immediately after her surgery which persists, but this numbness along her incision is new. She also reports this new "firmness" along her abdominal incision. Pt states that she was getting lymphatic massages every week before her admission to hospital and notes that she was not been talking very often since her admission.   Pt also notes that she had two episodes of red tinged urine today. Pt reports small streaks of red while wiping and in the toilet bowl. Did not have a BM at at the time. Pt still menstruates and her LMP was two weeks ago. She denies abd pain, dysuria, vaginal itching, and vaginal discharge and rectal bleeding. Pt not on any AC.     Vital Signs Last 24 Hrs  T(C): 37.2 (08 Feb 2025 21:19), Max: 37.2 (08 Feb 2025 21:19)  T(F): 98.9 (08 Feb 2025 21:19), Max: 98.9 (08 Feb 2025 21:19)  HR: 82 (08 Feb 2025 21:19) (64 - 82)  BP: 109/70 (08 Feb 2025 21:19) (103/70 - 109/70)  BP(mean): --  RR: 18 (08 Feb 2025 21:19) (17 - 18)  SpO2: 97% (08 Feb 2025 21:19) (95% - 97%)    Parameters below as of 08 Feb 2025 21:19  Patient On (Oxygen Delivery Method): room air          Labs:                          11.5   6.85  )-----------( 394      ( 08 Feb 2025 07:13 )             35.9     02-08    137  |  100  |  9   ----------------------------<  103[H]  3.7   |  23  |  0.69    Ca    9.1      08 Feb 2025 07:14                  Radiology:        Physical Exam:  General: WN/WD, NAD, nontoxic appearing  Neurology: A&Ox3, nonfocal, ALAS x 4  Head:  Normocephalic, atraumatic  Abdominal: Soft. Healing abdominal incision. No erythema or drainage from site. [place] and well approximated, with small areas of scabbing. Small area of induration along midline of incision. Non tender. Not warm to touch.   MSK: No edema,    Assessment & Plan:  HPI:  49 year old female hx of ulcerative colitis in remission, psoriatic arthritis, chronic back pain from degenerative disc disease, spinal stenosis, s/p breast reduction and repair of rectus diasti in [place] on 12/19 presenting with concerns for wound dehiscence. Wounds appeared to be healing well. However around Friday 1/31, pt started experiencing some pain around incision site. On Saturday, pt took a shower after which she noted reopening or her surgical site with some drainage of clear yellowish fluid from her L breast. She also notes smaller opening in her R breast as well.  She has been in contact with her surgeon from [place] who recommended for her to come to ED for further evaluation. Pt also went to urgent care for evaluation and was prescribed doxycyline and levaquin. In ED, pt was evaluated by Plastics and Surgery. Breast US showed complex fluid collection in L breast c/f abscess along with smaller collection in R breast. Pt admitted for further management.     Abdominal numbness and induration along incision site  > Discussed with overnight plastics resident who will follow up  [phone]  > Afebrile and pt on broad spectrum abx without abdominal tenderness or wound dehiscence, will continue to monitor for acute changes c/f infection  > UA  >Will endorse to primary team in AM        Aida Powell PA-C  Dept of Medicine  [id number]

## 2025-02-08 NOTE — PROGRESS NOTE ADULT - REASON FOR ADMISSION
Daughter Cosmo White called ostomy nurse at outpatient clinic at [facility] regarding supplies and pouching suggestions. Pt currently at [place] [address] and they are having a hard time keeping a pouch in place for more than a few hours.   Pt also having high outp
breast wound, drainage

## 2025-02-08 NOTE — PROGRESS NOTE ADULT - TIME-BASED BILLING (NON-CRITICAL CARE)
Time-based billing (NON-critical care)
No

## 2025-02-08 NOTE — PROGRESS NOTE ADULT - TIME BILLING
- Reviewing, and interpreting labs and testing.  - Independently obtaining a review of systems and performing a physical exam  - Reviewing consultant documentation/recommendations in addition to discussing plan of care with consultants.  - Counselling and educating patient and family regarding interpretation of aforementioned items and plan of care.

## 2025-02-09 VITALS
OXYGEN SATURATION: 96 % | TEMPERATURE: 99 F | RESPIRATION RATE: 19 BRPM | HEART RATE: 80 BPM | SYSTOLIC BLOOD PRESSURE: 103 MMHG | DIASTOLIC BLOOD PRESSURE: 66 MMHG

## 2025-02-09 PROCEDURE — 87186 SC STD MICRODIL/AGAR DIL: CPT

## 2025-02-09 PROCEDURE — 87070 CULTURE OTHR SPECIMN AEROBIC: CPT

## 2025-02-09 PROCEDURE — 87075 CULTR BACTERIA EXCEPT BLOOD: CPT

## 2025-02-09 PROCEDURE — 87206 SMEAR FLUORESCENT/ACID STAI: CPT

## 2025-02-09 PROCEDURE — 85027 COMPLETE CBC AUTOMATED: CPT

## 2025-02-09 PROCEDURE — 99239 HOSP IP/OBS DSCHRG MGMT >30: CPT

## 2025-02-09 PROCEDURE — 85730 THROMBOPLASTIN TIME PARTIAL: CPT

## 2025-02-09 PROCEDURE — 76942 ECHO GUIDE FOR BIOPSY: CPT

## 2025-02-09 PROCEDURE — 89051 BODY FLUID CELL COUNT: CPT

## 2025-02-09 PROCEDURE — 10160 PNXR ASPIR ABSC HMTMA BULLA: CPT

## 2025-02-09 PROCEDURE — 96374 THER/PROPH/DIAG INJ IV PUSH: CPT

## 2025-02-09 PROCEDURE — 86850 RBC ANTIBODY SCREEN: CPT

## 2025-02-09 PROCEDURE — 81001 URINALYSIS AUTO W/SCOPE: CPT

## 2025-02-09 PROCEDURE — 76642 ULTRASOUND BREAST LIMITED: CPT

## 2025-02-09 PROCEDURE — 80202 ASSAY OF VANCOMYCIN: CPT

## 2025-02-09 PROCEDURE — 87116 MYCOBACTERIA CULTURE: CPT

## 2025-02-09 PROCEDURE — 80048 BASIC METABOLIC PNL TOTAL CA: CPT

## 2025-02-09 PROCEDURE — 85025 COMPLETE CBC W/AUTO DIFF WBC: CPT

## 2025-02-09 PROCEDURE — 80053 COMPREHEN METABOLIC PANEL: CPT

## 2025-02-09 PROCEDURE — 96375 TX/PRO/DX INJ NEW DRUG ADDON: CPT

## 2025-02-09 PROCEDURE — 87205 SMEAR GRAM STAIN: CPT

## 2025-02-09 PROCEDURE — 86900 BLOOD TYPING SEROLOGIC ABO: CPT

## 2025-02-09 PROCEDURE — 99285 EMERGENCY DEPT VISIT HI MDM: CPT | Mod: 25

## 2025-02-09 PROCEDURE — 85610 PROTHROMBIN TIME: CPT

## 2025-02-09 PROCEDURE — 87077 CULTURE AEROBIC IDENTIFY: CPT

## 2025-02-09 PROCEDURE — 86901 BLOOD TYPING SEROLOGIC RH(D): CPT

## 2025-02-09 RX ORDER — DOXYCYCLINE HYCLATE 100 MG/1
100 CAPSULE ORAL EVERY 12 HOURS
Refills: 0 | Status: DISCONTINUED | OUTPATIENT
Start: 2025-02-09 | End: 2025-02-09

## 2025-02-09 RX ORDER — OXYCODONE HYDROCHLORIDE 30 MG/1
1 TABLET ORAL
Qty: 12 | Refills: 0
Start: 2025-02-09 | End: 2025-02-11

## 2025-02-09 RX ORDER — AMOXICILLIN AND CLAVULANATE POTASSIUM 200; 28.5 MG/5ML; MG/5ML
1 POWDER, FOR SUSPENSION ORAL EVERY 12 HOURS
Refills: 0 | Status: DISCONTINUED | OUTPATIENT
Start: 2025-02-09 | End: 2025-02-09

## 2025-02-09 RX ORDER — AMOXICILLIN AND CLAVULANATE POTASSIUM 200; 28.5 MG/5ML; MG/5ML
1 POWDER, FOR SUSPENSION ORAL
Qty: 16 | Refills: 0
Start: 2025-02-09 | End: 2025-02-16

## 2025-02-09 RX ORDER — ACETAMINOPHEN 160 MG/5ML
2 SUSPENSION ORAL
Qty: 0 | Refills: 0 | DISCHARGE

## 2025-02-09 RX ORDER — DOXYCYCLINE HYCLATE 100 MG/1
2 CAPSULE ORAL
Qty: 32 | Refills: 0
Start: 2025-02-09 | End: 2025-02-16

## 2025-02-09 RX ADMIN — VANCOMYCIN HYDROCHLORIDE 166.67 MILLIGRAM(S): KIT at 01:04

## 2025-02-09 RX ADMIN — OXYCODONE HYDROCHLORIDE 5 MILLIGRAM(S): 30 TABLET ORAL at 00:50

## 2025-02-09 RX ADMIN — AMOXICILLIN AND CLAVULANATE POTASSIUM 1 TABLET(S): 200; 28.5 POWDER, FOR SUSPENSION ORAL at 12:55

## 2025-02-09 RX ADMIN — ACETAMINOPHEN 650 MILLIGRAM(S): 160 SUSPENSION ORAL at 14:30

## 2025-02-09 RX ADMIN — DOXYCYCLINE HYCLATE 100 MILLIGRAM(S): 100 CAPSULE ORAL at 12:56

## 2025-02-09 RX ADMIN — PIPERACILLIN SODIUM AND TAZOBACTAM SODIUM 25 GRAM(S): 2; 250 INJECTION, POWDER, FOR SOLUTION INTRAVENOUS at 04:30

## 2025-02-09 RX ADMIN — Medication 1 APPLICATION(S): at 12:56

## 2025-02-09 RX ADMIN — ACETAMINOPHEN 650 MILLIGRAM(S): 160 SUSPENSION ORAL at 13:51

## 2025-02-09 NOTE — DISCHARGE NOTE NURSING/CASE MANAGEMENT/SOCIAL WORK - NSDCVIVACCINE_GEN_ALL_CORE_FT
Tdap; 12-Aug-2011 02:30; Yessenia Red (RN); [employer]; [id number] (Exp. Date: 20-Dec-2013); IM; LArm; 0.5 cc;   Tdap; 13-Jun-2015 06:15; Anu Dennis (RN); [employer]; [id number]; IntraMuscular; Deltoid Left.; 0.5 milliLiter(s); VIS (VIS Published: 09-May-2013, VIS Presented: 13-Jun-2015);

## 2025-02-09 NOTE — PROGRESS NOTE ADULT - ASSESSMENT
49y old  Female who presents with a chief complaint of bilateral breast pain after breast reductions in [place] DX bilateral breast abscess / cellulitis and bilateral breast wounds. U/S with breast complex fluid collection consistent with abscess. S/P IR drainage bilaterally on 2/7.    Plan  - Apply telfa + bacitraicin to left breast inferior slough, change daily  - Vanc + Zosyn  - s/p IR drainage, f/u Fluid Cx for abx guidance  -Corynebacterium growing from wound swab  - Remainder of care per primary team     Plastic Surgery
Patient is a 49y old  Female who presents with a chief complaint of bilateral breast pain after breast reductions in [place] DX bilateral breast abscess / cellulitis and bilateral breast wounds. U/S with breast complex fluid collection consistent with abscess.     - Local wound care with bacitracin  - Vanc + Zosyn  - IR drainage of Left breast abscess today  -F/u Fluid Cx    Plastic Surgery     
49 f with  ulcerative colitis in remission, psoriatic arthritis but not on any immunosuppresion, chronic back pain from degenerative disc disease, spinal stenosis, s/p breast reduction and repair of rectus diasti in [place] on 12/19, now p/w concerns for wound dehiscence on b/l breast and had some purulent drainage from them, was started on doxy and levo 4 days PTA  afebrile, no WBC  u/s showed Complex fluid collection with surrounding vascularity of the left breast 5:00 position along the incision site, suspicious for abscess. No sonographic abnormality in the region of the left lower outer breast/underarm region. Small subcentimeter mildly complex fluid collection of the right lower outer breast/underarm area without associated vascularity which may represent small postoperative hematoma/seroma or abscess.  wound swab cx negative      b/l wound dehiscence and purulent drainage 7 weeks after breast reduction, u/s with b/l small complex fluid collection suspicious for abscess, initial wound cx 2/5 negative  cx negative and also timing (7 weeks after) concerning for mycobacterial infection as well but was also started on doxy, levo 4 days PTA  s/p IR aspiration of b/l breast collections 2/6, cx negative for now    * c/w vanco and zosyn while in the hospital  * would follow the IR cx and adjust antibiotics but pt needs to leave so we can switch to PO augmentin 875 bid and doxy 100 bid for 10 days to follow the cultures  * will follow the [employer] cx  * follow with plastics and ID as outpatient     The above assessment and plan was discussed with the primary team    Cherelle Hartmann MD  contact on teams  After 5pm and on weekends call [phone]   
Patient is a 49y old  Female who presents with a chief complaint of bilateral breast pain after breast reductions in [place] DX bilateral breast abscess / cellulitis and bilateral breast wounds. U/S with breast complex fluid collection consistent with abscess. S/P IR drainage bilaterally on 2/7.    Plan  - Apply telfa + bacitraicin to left breast inferior slough, change daily  - Vanc + Zosyn  - s/p IR drainage, f/u Fluid Cx for abx guidance  - Remainder of care per primary team     Mat Lopez PGY-1  Plastic Surgery  Teams preferred for non-urgent queries  [facility]: [id number]  [facility]: [phone]   
49 year old female hx of ulcerative colitis in remission, psoriatic arthritis, chronic back pain from degenerative disc disease, spinal stenosis, s/p breast reduction and repair of rectus diasti in [place] on 12/19 presenting with concerns for wound dehiscence. Found to have a complex fluid collection in L breast c/f abscess. Pt admitted for further management. 
49 year old female hx of ulcerative colitis in remission, psoriatic arthritis, chronic back pain from degenerative disc disease, spinal stenosis, s/p breast reduction and repair of rectus diasti in [place] on 12/19 presenting with concerns for wound dehiscence. Found to have a complex fluid collection in L breast c/f abscess. Pt admitted for further management. 
49 year old female hx of ulcerative colitis in remission, psoriatic arthritis, chronic back pain from degenerative disc disease, spinal stenosis, s/p breast reduction and repair of rectus diasti in [place] on 12/19 presenting with concerns for wound dehiscence. Found to have a complex fluid collection in L breast c/f abscess. Pt admitted for further management.

## 2025-02-09 NOTE — DISCHARGE NOTE NURSING/CASE MANAGEMENT/SOCIAL WORK - NSDCPEFALRISK_GEN_ALL_CORE
For information on Fall & Injury Prevention, visit: [email or web address] OR  [email or web address] OR  https://www.cdc.gov/steadi/patient.html

## 2025-02-09 NOTE — DISCHARGE NOTE NURSING/CASE MANAGEMENT/SOCIAL WORK - FINANCIAL ASSISTANCE
[facility] provides services at a reduced cost to those who are determined to be eligible through [facility]’s financial assistance program. Information regarding [facility]’s financial assistance program can be found by going to [email or web address] or by calling [phone].

## 2025-02-09 NOTE — DISCHARGE NOTE NURSING/CASE MANAGEMENT/SOCIAL WORK - NSDCFUADDAPPT_GEN_ALL_CORE_FT
APPTS ARE READY TO BE MADE: [X] YES    Best Family or Patient Contact (if needed):    Additional Information about above appointments (if needed):    1: ID - Dr. Hartmann within 1-2 weeks  2: Plastic surgery - Dr. Richey within 1 week  3: PCP within 1 week     Other comments or requests:

## 2025-02-09 NOTE — DISCHARGE NOTE NURSING/CASE MANAGEMENT/SOCIAL WORK - PATIENT PORTAL LINK FT
You can access the FollowMyHealth Patient Portal offered by [facility] by registering at the following website: [email or web address]. By joining [employer]’s FollowMyHealth portal, you will also be able to view your health information using other applications (apps) compatible with our system.

## 2025-02-09 NOTE — PROGRESS NOTE ADULT - PROVIDER SPECIALTY LIST ADULT
Infectious Disease
Plastic Surgery
Hospitalist

## 2025-02-09 NOTE — PROGRESS NOTE ADULT - SUBJECTIVE AND OBJECTIVE BOX
David Richey MD FACS  Plastic & Reconstructive Surgery  [address] [address]    [phone]    Patient:KENTRELL MAY  [id number]      Subjective:  Patient is a 49y old   Female who presents with a chief complaint of breast pain and drainage ultrasound with abscess    This morning co breast pain      REVIEW OF SYSTEMS:    CONSTITUTIONAL: No weakness, fevers or chills  EYES/ENT: No visual changes;  No vertigo or throat pain   NECK: No pain or stiffness  RESPIRATORY: No cough, wheezing, hemoptysis; No shortness of breath  CARDIOVASCULAR: No chest pain or palpitations  GASTROINTESTINAL: No abdominal or epigastric pain. No nausea, vomiting, or hematemesis; No diarrhea or constipation. No melena or hematochezia.  GENITOURINARY: No dysuria, frequency or hematuria  NEUROLOGICAL: No numbness or weakness  SKIN: bilateral breast wounds  BREAST: Breast pain left worse than right      Objective:  T(C): 36.7 (02-06-25 @ 08:41), Max: 36.9 (02-05-25 @ 21:31)  HR: 88 (02-06-25 @ 08:41) (72 - 95)  BP: 98/63 (02-06-25 @ 08:41) (98/63 - 123/78)  RR: 18 (02-06-25 @ 08:41) (18 - 20)  SpO2: 96% (02-06-25 @ 08:41) (96% - 98%)  Wt(kg): --   02-05    137  |  100  |  8   ----------------------------<  103[H]  4.0   |  25  |  0.55    Ca    9.7      05 Feb 2025 18:57    TPro  7.5  /  Alb  4.1  /  TBili  0.2  /  DBili  x   /  AST  18  /  ALT  12  /  AlkPhos  96  02-05                        12.2   5.76  )-----------( 423      ( 05 Feb 2025 18:57 )             39.2       PHYSICAL EXAM:    General:  NAD  Normal Affect  Chest Clear  Abdomen Soft NT  Bilateral breast with small wounds and inferior pole periincision induration  stable cellulitis          MEDICATIONS  (STANDING):  bacitracin   Ointment 1 Application(s) Topical daily  piperacillin/tazobactam IVPB.. 3.375 Gram(s) IV Intermittent every 8 hours  vancomycin  IVPB 1250 milliGRAM(s) IV Intermittent every 12 hours    MEDICATIONS  (PRN):  acetaminophen     Tablet .. 650 milliGRAM(s) Oral every 6 hours PRN Temp greater or equal to 38C (100.4F), Mild Pain (1 - 3)  oxyCODONE    IR 2.5 milliGRAM(s) Oral every 8 hours PRN Moderate Pain (4 - 6)  oxyCODONE    IR 5 milliGRAM(s) Oral every 8 hours PRN Severe Pain (7 - 10)      Assessment/Plan:  Patient is a 49y old  Female who presents with a chief complaint of  bilateral breast pain after breast reductions in [place] DX bilateral breast abscess / cellulitis and bilateral breast wounds  - Local wound care with bacitracin  - Abx   - IR drainage      
OVERNIGHT EVENTS: NAEO    SUBJECTIVE: Pt seen and examined at bedside. Patient comfortable and in no-apparent distress. Pain is controlled. Plan for IR drainage of L breast abscess today.    MEDICATIONS  (STANDING):  bacitracin   Ointment 1 Application(s) Topical daily  piperacillin/tazobactam IVPB.. 3.375 Gram(s) IV Intermittent every 8 hours  vancomycin  IVPB 1250 milliGRAM(s) IV Intermittent every 12 hours    MEDICATIONS  (PRN):  acetaminophen     Tablet .. 650 milliGRAM(s) Oral every 6 hours PRN Temp greater or equal to 38C (100.4F), Mild Pain (1 - 3)  oxyCODONE    IR 2.5 milliGRAM(s) Oral every 6 hours PRN Moderate Pain (4 - 6)  oxyCODONE    IR 5 milliGRAM(s) Oral every 6 hours PRN Severe Pain (7 - 10)    T(C): 36.4 (02-07-25 @ 05:13), Max: 37.2 (02-06-25 @ 21:24)  HR: 100 (02-07-25 @ 05:13) (86 - 100)  BP: 122/86 (02-07-25 @ 05:13) (98/63 - 122/86)  RR: 18 (02-07-25 @ 05:13) (18 - 18)  SpO2: 95% (02-07-25 @ 05:13) (95% - 96%)    02-06-25 @ 07:01  -  02-07-25 @ 07:00  --------------------------------------------------------  IN: 480 mL / OUT: 0 mL / NET: 480 mL      LABS:                        10.9   5.80  )-----------( 356      ( 07 Feb 2025 07:04 )             34.1     02-07    137  |  101  |  12  ----------------------------<  101[H]  4.5   |  23  |  0.66    Ca    9.2      07 Feb 2025 06:59    TPro  7.5  /  Alb  4.1  /  TBili  0.2  /  DBili  x   /  AST  18  /  ALT  12  /  AlkPhos  96  02-05    PT/INR - ( 07 Feb 2025 07:04 )   PT: 12.3 sec;   INR: 1.07 ratio         PTT - ( 07 Feb 2025 07:04 )  PTT:30.0 sec  Urinalysis Basic - ( 07 Feb 2025 06:59 )    Color: x / Appearance: x / SG: x / pH: x  Gluc: 101 mg/dL / Ketone: x  / Bili: x / Urobili: x   Blood: x / Protein: x / Nitrite: x   Leuk Esterase: x / RBC: x / WBC x   Sq Epi: x / Non Sq Epi: x / Bacteria: x      PE:  Gen: NAD  CV: Pulse regular present  Resp: Nonlabored  Breast: L breast tender with mild induration 
  Follow Up:  breast abscess after reduction    Interval History/ROS: s/p IR aspiration of b/l breasts, some pain but no fever or vomiting           Allergies  No Known Allergies        ANTIMICROBIALS:  piperacillin/tazobactam IVPB.. 3.375 every 8 hours  vancomycin  IVPB 1250 every 12 hours      OTHER MEDS:  acetaminophen     Tablet .. 650 milliGRAM(s) Oral every 6 hours PRN  bacitracin   Ointment 1 Application(s) Topical daily  oxyCODONE    IR 2.5 milliGRAM(s) Oral every 6 hours PRN  oxyCODONE    IR 5 milliGRAM(s) Oral every 6 hours PRN      Vital Signs Last 24 Hrs  T(C): 36.8 (07 Feb 2025 11:51), Max: 37.2 (06 Feb 2025 21:24)  T(F): 98.2 (07 Feb 2025 11:51), Max: 98.9 (06 Feb 2025 21:24)  HR: 80 (07 Feb 2025 11:51) (80 - 100)  BP: 103/71 (07 Feb 2025 11:51) (103/71 - 122/86)  BP(mean): --  RR: 18 (07 Feb 2025 11:51) (18 - 18)  SpO2: 95% (07 Feb 2025 11:51) (95% - 96%)    Parameters below as of 07 Feb 2025 11:51  Patient On (Oxygen Delivery Method): room air        Physical Exam:  General:    NAD, non toxic  Respiratory:   comfortable on RA  abd:   soft,  not tender  :    no mariano  Musculoskeletal : no joint swelling  Skin:    small opening on b/l breast incision,  L wound slightly bigger  vascular: no phlebitis                          10.9   5.80  )-----------( 356      ( 07 Feb 2025 07:04 )             34.1       02-07    137  |  101  |  12  ----------------------------<  101[H]  4.5   |  23  |  0.66    Ca    9.2      07 Feb 2025 06:59    TPro  7.5  /  Alb  4.1  /  TBili  0.2  /  DBili  x   /  AST  18  /  ALT  12  /  AlkPhos  96  02-05      Urinalysis Basic - ( 07 Feb 2025 06:59 )    Color: x / Appearance: x / SG: x / pH: x  Gluc: 101 mg/dL / Ketone: x  / Bili: x / Urobili: x   Blood: x / Protein: x / Nitrite: x   Leuk Esterase: x / RBC: x / WBC x   Sq Epi: x / Non Sq Epi: x / Bacteria: x        MICROBIOLOGY:  Vancomycin Level, Trough: 8.7 ug/mL (02-07-25 @ 10:50)  v  .Abscess  02-05-25   No growth  --    No polymorphonuclear cells seen per low power field  No organisms seen per oil power field                RADIOLOGY:  Images independently visualized and reviewed personally, findings as below  Successful bilateral breast collection aspiration. focused ultrasound of the breasts demonstrates small 1-2 cm heterogenous collections just beneath the surgical incision.   Scant purulent fluid from the left and approximately 1-2 cc of purulent fluid aspirated from the right breast.  < from: US Breast Limited, Bilateral (02.05.25 @ 20:24) >  IMPRESSION:  Complex fluid collection with surrounding vascularity of the left breast   5:00 position along the incision site, suspicious for abscess.    No sonographic abnormality in the region of the left lower outer   breast/underarm region.    Small subcentimeter mildly complex fluid collection of the right lower   outer breast/underarm area without associated vascularity which may   represent small postoperative hematoma/seroma or abscess.    If symptoms do not resolve clinically, additional imaging in a dedicated   breast imaging center should be performed .    < end of copied text >  
Patient seen in full consultation    Briefly :    Bilateral breast reduction with post op abscess bilateral (small)  Patient reports worsening symptoms   Admit for IV Abx  Local wound care with bacitracin  Dry dressing      IR consult/aspiration and then DC on Oral Abx if unable to aspirate DC on Oral Abx with outpatient FU  DW patient  Admit to medicine  No plan for acute surgical intervention  
Plastic Surgery Progress Note (pg [facility]: [id number], [facility]: [phone])    SUBJECTIVE  The patient was seen and examined. No acute events overnight. Pain controlled, afebrile w/ stable vitals. S/P IR drainage yesterday.    OBJECTIVE  ___________________________________________________  VITAL SIGNS / I&O's   Vital Signs Last 24 Hrs  T(C): 36.5 (08 Feb 2025 04:33), Max: 36.8 (07 Feb 2025 11:51)  T(F): 97.7 (08 Feb 2025 04:33), Max: 98.2 (07 Feb 2025 11:51)  HR: 64 (08 Feb 2025 04:33) (64 - 80)  BP: 103/70 (08 Feb 2025 04:33) (100/66 - 103/71)  BP(mean): --  RR: 17 (08 Feb 2025 04:33) (17 - 18)  SpO2: 96% (08 Feb 2025 04:33) (95% - 96%)    Parameters below as of 08 Feb 2025 04:33  Patient On (Oxygen Delivery Method): room air          06 Feb 2025 07:01  -  07 Feb 2025 07:00  --------------------------------------------------------  IN:    Oral Fluid: 480 mL  Total IN: 480 mL    OUT:  Total OUT: 0 mL    Total NET: 480 mL      07 Feb 2025 07:01  -  08 Feb 2025 06:27  --------------------------------------------------------  IN:    IV PiggyBack: 600 mL    Oral Fluid: 480 mL  Total IN: 1080 mL    OUT:  Total OUT: 0 mL    Total NET: 1080 mL        ___________________________________________________  PHYSICAL EXAM    -- CONSTITUTIONAL: NAD, lying in bed  -- Breast: s/p IR drainage. L breast tender to the upper pole, mild induration, superficial 1cm epidermal slough to the left inferior aspect       ___________________________________________________  LABS                        10.9   5.80  )-----------( 356      ( 07 Feb 2025 07:04 )             34.1     07 Feb 2025 06:59    137    |  101    |  12     ----------------------------<  101    4.5     |  23     |  0.66     Ca    9.2        07 Feb 2025 06:59      PT/INR - ( 07 Feb 2025 07:04 )   PT: 12.3 sec;   INR: 1.07 ratio         PTT - ( 07 Feb 2025 07:04 )  PTT:30.0 sec  CAPILLARY BLOOD GLUCOSE            Urinalysis Basic - ( 07 Feb 2025 06:59 )    Color: x / Appearance: x / SG: x / pH: x  Gluc: 101 mg/dL / Ketone: x  / Bili: x / Urobili: x   Blood: x / Protein: x / Nitrite: x   Leuk Esterase: x / RBC: x / WBC x   Sq Epi: x / Non Sq Epi: x / Bacteria: x      ___________________________________________________  MICRO  Recent Cultures:  Specimen Source: .Other, 02-07 @ 10:58; Results --; Gram Stain:   Rare polymorphonuclear leukocytes seen per low power field  No organisms seen per oil power field; Organism: --  Specimen Source: .Other, 02-07 @ 10:55; Results --; Gram Stain:   Few polymorphonuclear leukocytes per low power field  No organisms seen per oil power field; Organism: --  Specimen Source: .Abscess, 02-05 @ 21:04; Results   Growth in fluid media only Gram Positive Rods[!]; Gram Stain:   No polymorphonuclear cells seen per low power field  No organisms seen per oil power field[!]; Organism: --    ___________________________________________________  MEDICATIONS  (STANDING):  bacitracin   Ointment 1 Application(s) Topical daily  piperacillin/tazobactam IVPB.. 3.375 Gram(s) IV Intermittent every 8 hours  vancomycin  IVPB 1250 milliGRAM(s) IV Intermittent every 12 hours    MEDICATIONS  (PRN):  acetaminophen     Tablet .. 650 milliGRAM(s) Oral every 6 hours PRN Temp greater or equal to 38C (100.4F), Mild Pain (1 - 3)  oxyCODONE    IR 2.5 milliGRAM(s) Oral every 6 hours PRN Moderate Pain (4 - 6)  oxyCODONE    IR 5 milliGRAM(s) Oral every 6 hours PRN Severe Pain (7 - 10)  
OVERNIGHT EVENTS: NAEO    SUBJECTIVE: Pt seen and examined at bedside. Patient comfortable and in no-apparent distress. Pain is controlled. AVSS. Reporting hardening under abdominoplasty incision, but bilateral breasts with improving tenderness.     MEDICATIONS  (STANDING):  bacitracin   Ointment 1 Application(s) Topical daily  piperacillin/tazobactam IVPB.. 3.375 Gram(s) IV Intermittent every 8 hours  vancomycin  IVPB 1250 milliGRAM(s) IV Intermittent every 12 hours    MEDICATIONS  (PRN):  acetaminophen     Tablet .. 650 milliGRAM(s) Oral every 6 hours PRN Temp greater or equal to 38C (100.4F), Mild Pain (1 - 3)  oxyCODONE    IR 2.5 milliGRAM(s) Oral every 6 hours PRN Moderate Pain (4 - 6)  oxyCODONE    IR 5 milliGRAM(s) Oral every 6 hours PRN Severe Pain (7 - 10)    T(C): 37.1 (25 @ 04:13), Max: 37.2 (25 @ 21:19)  HR: 73 (25 @ 04:13) (73 - 82)  BP: 105/68 (25 @ 04:13) (105/68 - 109/70)  RR: 19 (25 @ 04:13) (18 - 19)  SpO2: 95% (25 @ 04:13) (95% - 97%)    25 @ 07:01  -  25 @ 07:00  --------------------------------------------------------  IN: 1170 mL / OUT: 0 mL / NET: 1170 mL      LABS:                        11.5   6.85  )-----------( 394      ( 2025 07:13 )             35.9         137  |  100  |  9   ----------------------------<  103[H]  3.7   |  23  |  0.69    Ca    9.1      2025 07:14        Urinalysis Basic - ( 2025 22:22 )    Color: Yellow / Appearance: Clear / S.010 / pH: x  Gluc: x / Ketone: Negative mg/dL  / Bili: Negative / Urobili: 0.2 mg/dL   Blood: x / Protein: Trace mg/dL / Nitrite: Negative   Leuk Esterase: Negative / RBC: 3 /HPF / WBC 1 /HPF   Sq Epi: x / Non Sq Epi: 1 /HPF / Bacteria: Negative /HPF      PE:  Gen: NAD  CV: Pulse regular present  Resp: Nonlabored  Abdomen: Suprapubic induration in proximity to abdominoplasty incisions, nonerythematous, incision c/d/i
[facility] Division of Hospital Medicine  Waqar Cuevas MD  Available on Teams Puma    Patient is a 49y old  Female who presents with a chief complaint of breast wound, drainage (07 Feb 2025 14:26)      SUBJECTIVE / OVERNIGHT EVENTS:  Patient seen and evaluated at bedside, with no acute overnight events. This AM notes pain is controlled, just experiences discharge whenever anyone examines wound b/c removing telfa 'tears' at scab. Denies any f/c, CP, abd pain, N/V/D.     ADDITIONAL REVIEW OF SYSTEMS: negative    MEDICATIONS  (STANDING):  bacitracin   Ointment 1 Application(s) Topical daily  piperacillin/tazobactam IVPB.. 3.375 Gram(s) IV Intermittent every 8 hours  vancomycin  IVPB 1250 milliGRAM(s) IV Intermittent every 12 hours    MEDICATIONS  (PRN):  acetaminophen     Tablet .. 650 milliGRAM(s) Oral every 6 hours PRN Temp greater or equal to 38C (100.4F), Mild Pain (1 - 3)  oxyCODONE    IR 2.5 milliGRAM(s) Oral every 6 hours PRN Moderate Pain (4 - 6)  oxyCODONE    IR 5 milliGRAM(s) Oral every 6 hours PRN Severe Pain (7 - 10)      CAPILLARY BLOOD GLUCOSE        I&O's Summary    07 Feb 2025 07:01  -  08 Feb 2025 07:00  --------------------------------------------------------  IN: 1180 mL / OUT: 0 mL / NET: 1180 mL    08 Feb 2025 07:01  -  08 Feb 2025 17:05  --------------------------------------------------------  IN: 120 mL / OUT: 0 mL / NET: 120 mL        PHYSICAL EXAM:  Vital Signs Last 24 Hrs  T(C): 36.5 (08 Feb 2025 12:03), Max: 36.5 (08 Feb 2025 04:33)  T(F): 97.7 (08 Feb 2025 12:03), Max: 97.7 (08 Feb 2025 04:33)  HR: 77 (08 Feb 2025 12:03) (64 - 77)  BP: 108/70 (08 Feb 2025 12:03) (100/66 - 108/70)  BP(mean): --  RR: 18 (08 Feb 2025 12:03) (17 - 18)  SpO2: 95% (08 Feb 2025 12:03) (95% - 96%)    Parameters below as of 08 Feb 2025 12:03  Patient On (Oxygen Delivery Method): room air    CONSTITUTIONAL: Well-groomed, in no apparent distress  EYES: No conjunctival or scleral injection, non-icteric; PERRLA and symmetric  ENMT: No external nasal lesions; no pharyngeal injection or exudates, oral mucosa with moist membranes  RESPIRATORY: Breathing comfortably; lungs CTA without wheeze/rhonchi/rales  CARDIOVASCULAR: +S1S2, RRR, no M/G/R; pedal pulses full and symmetric; no lower extremity edema  GASTROINTESTINAL: No palpable masses or tenderness, +BS throughout, no rebound/guarding  MUSCULOSKELETAL: no digital clubbing or cyanosis  SKIN: s/p IR drainage/aspiration of b/l breasts, with overlying dressing in place. L upper breast with small area of induration, improved from day prior.   NEUROLOGIC: CN II-XII intact; sensation intact in LEs b/l to light touch  PSYCHIATRIC: A+O x 3; mood and affect appropriate; appropriate insight and judgment    LABS:                        11.5   6.85  )-----------( 394      ( 08 Feb 2025 07:13 )             35.9     02-08    137  |  100  |  9   ----------------------------<  103[H]  3.7   |  23  |  0.69    Ca    9.1      08 Feb 2025 07:14      PT/INR - ( 07 Feb 2025 07:04 )   PT: 12.3 sec;   INR: 1.07 ratio         PTT - ( 07 Feb 2025 07:04 )  PTT:30.0 sec      Urinalysis Basic - ( 08 Feb 2025 07:14 )    Color: x / Appearance: x / SG: x / pH: x  Gluc: 103 mg/dL / Ketone: x  / Bili: x / Urobili: x   Blood: x / Protein: x / Nitrite: x   Leuk Esterase: x / RBC: x / WBC x   Sq Epi: x / Non Sq Epi: x / Bacteria: x        Culture - Aspirate with Gram Stain (collected 07 Feb 2025 10:58)  Source: Aspirate  Gram Stain (08 Feb 2025 04:00):    Rare polymorphonuclear leukocytes seen per low power field    No organisms seen per oil power field  Preliminary Report (08 Feb 2025 16:26):    No growth    Culture - Acid Fast - Other w/Smear (collected 07 Feb 2025 10:58)  Source: .Other    Culture - Aspirate with Gram Stain (collected 07 Feb 2025 10:55)  Source: Aspirate  Gram Stain (08 Feb 2025 00:28):    Few polymorphonuclear leukocytes per low power field    No organisms seen per oil power field  Preliminary Report (08 Feb 2025 15:27):    No growth    Culture - Acid Fast - Other w/Smear (collected 07 Feb 2025 10:55)  Source: .Other    Culture - Abscess with Gram Stain (collected 05 Feb 2025 21:04)  Source: .Abscess  Gram Stain (08 Feb 2025 00:49):    No polymorphonuclear cells seen per low power field    No organisms seen per oil power field  Preliminary Report (08 Feb 2025 00:49):    Growth in fluid media only Gram Positive Rods        RADIOLOGY & ADDITIONAL TESTS:  Results Reviewed:   Imaging Personally Reviewed:  Electrocardiogram Personally Reviewed:    COORDINATION OF CARE:  Care Discussed with Consultants/Other Providers [Y/N]:  Prior or Outpatient Records Reviewed [Y/N]:  
[facility] Division of Hospital Medicine  Waqar Cuevas MD  Available on Teams [employer]    Patient is a 49y old  Female who presents with a chief complaint of breast wound, drainage (06 Feb 2025 15:06)      SUBJECTIVE / OVERNIGHT EVENTS:  Patient evaluated at bedside. States overnight/this AM left breast began expressing drainage on its own, and R side began feeling worse/with difficulty lifting arm 2/2 pressure/pain. Notes improvement now after IR aspiration. Denies any f/c, abd pain, N/V/D or any other acute concerns.     ADDITIONAL REVIEW OF SYSTEMS: negative    MEDICATIONS  (STANDING):  bacitracin   Ointment 1 Application(s) Topical daily  piperacillin/tazobactam IVPB.. 3.375 Gram(s) IV Intermittent every 8 hours  vancomycin  IVPB 1250 milliGRAM(s) IV Intermittent every 12 hours    MEDICATIONS  (PRN):  acetaminophen     Tablet .. 650 milliGRAM(s) Oral every 6 hours PRN Temp greater or equal to 38C (100.4F), Mild Pain (1 - 3)  oxyCODONE    IR 2.5 milliGRAM(s) Oral every 6 hours PRN Moderate Pain (4 - 6)  oxyCODONE    IR 5 milliGRAM(s) Oral every 6 hours PRN Severe Pain (7 - 10)      CAPILLARY BLOOD GLUCOSE        I&O's Summary    06 Feb 2025 07:01  -  07 Feb 2025 07:00  --------------------------------------------------------  IN: 480 mL / OUT: 0 mL / NET: 480 mL    07 Feb 2025 07:01  -  07 Feb 2025 14:26  --------------------------------------------------------  IN: 480 mL / OUT: 0 mL / NET: 480 mL        PHYSICAL EXAM:  Vital Signs Last 24 Hrs  T(C): 36.8 (07 Feb 2025 11:51), Max: 37.2 (06 Feb 2025 21:24)  T(F): 98.2 (07 Feb 2025 11:51), Max: 98.9 (06 Feb 2025 21:24)  HR: 80 (07 Feb 2025 11:51) (80 - 100)  BP: 103/71 (07 Feb 2025 11:51) (103/71 - 122/86)  BP(mean): --  RR: 18 (07 Feb 2025 11:51) (18 - 18)  SpO2: 95% (07 Feb 2025 11:51) (95% - 96%)    Parameters below as of 07 Feb 2025 11:51  Patient On (Oxygen Delivery Method): room air    CONSTITUTIONAL: Well-groomed, in no apparent distress  EYES: No conjunctival or scleral injection, non-icteric; PERRLA and symmetric  ENMT: No external nasal lesions; no pharyngeal injection or exudates, oral mucosa with moist membranes  RESPIRATORY: Breathing comfortably; lungs CTA without wheeze/rhonchi/rales  CARDIOVASCULAR: +S1S2, RRR, no M/G/R; pedal pulses full and symmetric; no lower extremity edema  GASTROINTESTINAL: No palpable masses or tenderness, +BS throughout, no rebound/guarding  MUSCULOSKELETAL: no digital clubbing or cyanosis  SKIN: B/l breast with erythema, L breast with open ulcer at 6 position, s/p IR drainage/aspiration, with overlying dressing in place.   NEUROLOGIC: CN II-XII intact; sensation intact in LEs b/l to light touch  PSYCHIATRIC: A+O x 3; mood and affect appropriate; appropriate insight and judgment    LABS:                        10.9   5.80  )-----------( 356      ( 07 Feb 2025 07:04 )             34.1     02-07    137  |  101  |  12  ----------------------------<  101[H]  4.5   |  23  |  0.66    Ca    9.2      07 Feb 2025 06:59    TPro  7.5  /  Alb  4.1  /  TBili  0.2  /  DBili  x   /  AST  18  /  ALT  12  /  AlkPhos  96  02-05    PT/INR - ( 07 Feb 2025 07:04 )   PT: 12.3 sec;   INR: 1.07 ratio         PTT - ( 07 Feb 2025 07:04 )  PTT:30.0 sec      Urinalysis Basic - ( 07 Feb 2025 06:59 )    Color: x / Appearance: x / SG: x / pH: x  Gluc: 101 mg/dL / Ketone: x  / Bili: x / Urobili: x   Blood: x / Protein: x / Nitrite: x   Leuk Esterase: x / RBC: x / WBC x   Sq Epi: x / Non Sq Epi: x / Bacteria: x        Culture - Abscess with Gram Stain (collected 05 Feb 2025 21:04)  Source: .Abscess  Gram Stain (06 Feb 2025 06:02):    No polymorphonuclear cells seen per low power field    No organisms seen per oil power field  Preliminary Report (06 Feb 2025 18:15):    No growth        RADIOLOGY & ADDITIONAL TESTS:  Results Reviewed:   Imaging Personally Reviewed:  Electrocardiogram Personally Reviewed:    COORDINATION OF CARE:  Care Discussed with Consultants/Other Providers [Y/N]:  Prior or Outpatient Records Reviewed [Y/N]:  
[facility] Division of Hospital Medicine  Waqar Cuevas MD  Available on Teams [employer]    Patient is a 49y old  Female who presents with a chief complaint of     SUBJECTIVE / OVERNIGHT EVENTS:  Patient seen and evaluated at bedside, with no acute overnight events. This AM reports continued drainage from both wounds, notes pain around L breast/axilla area. Denies any f/c, CP, SOB, abd pain or any other acute concerns.     ADDITIONAL REVIEW OF SYSTEMS: negative    MEDICATIONS  (STANDING):  bacitracin   Ointment 1 Application(s) Topical daily  piperacillin/tazobactam IVPB.. 3.375 Gram(s) IV Intermittent every 8 hours  vancomycin  IVPB 1250 milliGRAM(s) IV Intermittent every 12 hours    MEDICATIONS  (PRN):  acetaminophen     Tablet .. 650 milliGRAM(s) Oral every 6 hours PRN Temp greater or equal to 38C (100.4F), Mild Pain (1 - 3)  oxyCODONE    IR 2.5 milliGRAM(s) Oral every 6 hours PRN Moderate Pain (4 - 6)  oxyCODONE    IR 5 milliGRAM(s) Oral every 6 hours PRN Severe Pain (7 - 10)      CAPILLARY BLOOD GLUCOSE        I&O's Summary    06 Feb 2025 07:01  -  06 Feb 2025 15:07  --------------------------------------------------------  IN: 480 mL / OUT: 0 mL / NET: 480 mL        PHYSICAL EXAM:  Vital Signs Last 24 Hrs  T(C): 36.6 (06 Feb 2025 12:10), Max: 36.9 (05 Feb 2025 21:31)  T(F): 97.9 (06 Feb 2025 12:10), Max: 98.5 (05 Feb 2025 21:31)  HR: 86 (06 Feb 2025 12:10) (72 - 95)  BP: 107/71 (06 Feb 2025 12:10) (98/63 - 123/78)  BP(mean): 93 (05 Feb 2025 21:31) (93 - 93)  RR: 18 (06 Feb 2025 12:10) (18 - 20)  SpO2: 96% (06 Feb 2025 12:10) (96% - 98%)    Parameters below as of 06 Feb 2025 12:10  Patient On (Oxygen Delivery Method): room air    CONSTITUTIONAL: Well-groomed, in no apparent distress  EYES: No conjunctival or scleral injection, non-icteric; PERRLA and symmetric  ENMT: No external nasal lesions; no pharyngeal injection or exudates, oral mucosa with moist membranes  RESPIRATORY: Breathing comfortably; lungs CTA without wheeze/rhonchi/rales  CARDIOVASCULAR: +S1S2, RRR, no M/G/R; pedal pulses full and symmetric; no lower extremity edema  GASTROINTESTINAL: No palpable masses or tenderness, +BS throughout, no rebound/guarding  MUSCULOSKELETAL: no digital clubbing or cyanosis  SKIN: B/l breast with erythema, L breast with ulcer around 6:00 position with swelling and erythema, opening of incision around 5:00 position, R breast with open incision just underneath, no active drainage  NEUROLOGIC: CN II-XII intact; sensation intact in LEs b/l to light touch  PSYCHIATRIC: A+O x 3; mood and affect appropriate; appropriate insight and judgment    LABS:                        12.2   5.76  )-----------( 423      ( 05 Feb 2025 18:57 )             39.2     02-05    137  |  100  |  8   ----------------------------<  103[H]  4.0   |  25  |  0.55    Ca    9.7      05 Feb 2025 18:57    TPro  7.5  /  Alb  4.1  /  TBili  0.2  /  DBili  x   /  AST  18  /  ALT  12  /  AlkPhos  96  02-05          Urinalysis Basic - ( 05 Feb 2025 18:57 )    Color: x / Appearance: x / SG: x / pH: x  Gluc: 103 mg/dL / Ketone: x  / Bili: x / Urobili: x   Blood: x / Protein: x / Nitrite: x   Leuk Esterase: x / RBC: x / WBC x   Sq Epi: x / Non Sq Epi: x / Bacteria: x        Culture - Abscess with Gram Stain (collected 05 Feb 2025 21:04)  Source: .Abscess  Gram Stain (06 Feb 2025 06:02):    No polymorphonuclear cells seen per low power field    No organisms seen per oil power field        RADIOLOGY & ADDITIONAL TESTS:  Results Reviewed:   Imaging Personally Reviewed:  Electrocardiogram Personally Reviewed:    COORDINATION OF CARE:  Care Discussed with Consultants/Other Providers [Y/N]:  Prior or Outpatient Records Reviewed [Y/N]:

## 2025-02-10 PROBLEM — L40.50 ARTHROPATHIC PSORIASIS, UNSPECIFIED: Chronic | Status: ACTIVE | Noted: 2025-02-06

## 2025-02-10 PROBLEM — K51.90 ULCERATIVE COLITIS, UNSPECIFIED, WITHOUT COMPLICATIONS: Chronic | Status: ACTIVE | Noted: 2025-02-06

## 2025-02-10 PROBLEM — Z87.39 PERSONAL HISTORY OF OTHER DISEASES OF THE MUSCULOSKELETAL SYSTEM AND CONNECTIVE TISSUE: Chronic | Status: ACTIVE | Noted: 2025-02-06

## 2025-02-10 LAB
CULTURE RESULTS: ABNORMAL
SPECIMEN SOURCE: SIGNIFICANT CHANGE UP

## 2025-02-11 ENCOUNTER — NON-APPOINTMENT (OUTPATIENT)
Age: 50
End: 2025-02-11

## 2025-02-12 LAB
CULTURE RESULTS: SIGNIFICANT CHANGE UP
CULTURE RESULTS: SIGNIFICANT CHANGE UP
SPECIMEN SOURCE: SIGNIFICANT CHANGE UP
SPECIMEN SOURCE: SIGNIFICANT CHANGE UP

## 2025-02-13 LAB — NIGHT BLUE STAIN TISS: ABNORMAL

## 2025-03-03 ENCOUNTER — APPOINTMENT (OUTPATIENT)
Dept: INFECTIOUS DISEASE | Facility: CLINIC | Age: 50
End: 2025-03-03
Payer: COMMERCIAL

## 2025-03-03 ENCOUNTER — NON-APPOINTMENT (OUTPATIENT)
Age: 50
End: 2025-03-03

## 2025-03-03 VITALS
OXYGEN SATURATION: 97 % | SYSTOLIC BLOOD PRESSURE: 112 MMHG | HEIGHT: 66 IN | TEMPERATURE: 98.2 F | DIASTOLIC BLOOD PRESSURE: 79 MMHG | HEART RATE: 87 BPM | WEIGHT: 169 LBS | BODY MASS INDEX: 27.16 KG/M2

## 2025-03-03 DIAGNOSIS — N61.1 ABSCESS OF THE BREAST AND NIPPLE: ICD-10-CM

## 2025-03-03 DIAGNOSIS — A31.8 OTHER MYCOBACTERIAL INFECTIONS: ICD-10-CM

## 2025-03-03 PROCEDURE — 99214 OFFICE O/P EST MOD 30 MIN: CPT

## 2025-03-03 RX ORDER — LEVOFLOXACIN 750 MG/1
750 TABLET, FILM COATED ORAL DAILY
Qty: 30 | Refills: 5 | Status: ACTIVE | COMMUNITY
Start: 2025-03-03 | End: 1900-01-01

## 2025-03-03 RX ORDER — MINOCYCLINE HYDROCHLORIDE 100 MG/1
100 CAPSULE ORAL TWICE DAILY
Qty: 60 | Refills: 5 | Status: ACTIVE | COMMUNITY
Start: 2025-03-03 | End: 1900-01-01

## 2025-03-12 ENCOUNTER — APPOINTMENT (OUTPATIENT)
Dept: ENDOCRINOLOGY | Facility: CLINIC | Age: 50
End: 2025-03-12

## 2025-03-12 ENCOUNTER — NON-APPOINTMENT (OUTPATIENT)
Age: 50
End: 2025-03-12

## 2025-03-12 VITALS
TEMPERATURE: 97.9 F | OXYGEN SATURATION: 97 % | HEART RATE: 90 BPM | BODY MASS INDEX: 27.65 KG/M2 | SYSTOLIC BLOOD PRESSURE: 98 MMHG | HEIGHT: 65.5 IN | DIASTOLIC BLOOD PRESSURE: 62 MMHG | WEIGHT: 168 LBS

## 2025-03-12 DIAGNOSIS — R13.10 DYSPHAGIA, UNSPECIFIED: ICD-10-CM

## 2025-03-12 DIAGNOSIS — Z80.0 FAMILY HISTORY OF MALIGNANT NEOPLASM OF DIGESTIVE ORGANS: ICD-10-CM

## 2025-03-12 DIAGNOSIS — E66.3 OVERWEIGHT: ICD-10-CM

## 2025-03-12 PROCEDURE — 36415 COLL VENOUS BLD VENIPUNCTURE: CPT

## 2025-03-12 PROCEDURE — 99203 OFFICE O/P NEW LOW 30 MIN: CPT

## 2025-03-19 LAB
25(OH)D3 SERPL-MCNC: 24.2 NG/ML
ALBUMIN SERPL ELPH-MCNC: 3.7 G/DL
ALP BLD-CCNC: 120 U/L
ALT SERPL-CCNC: 7 U/L
ANION GAP SERPL CALC-SCNC: 10 MMOL/L
AST SERPL-CCNC: 9 U/L
BASOPHILS # BLD AUTO: 0.05 K/UL
BASOPHILS NFR BLD AUTO: 0.6 %
BILIRUB SERPL-MCNC: 0.4 MG/DL
BUN SERPL-MCNC: 13 MG/DL
CALCIUM SERPL-MCNC: 9.3 MG/DL
CHLORIDE SERPL-SCNC: 100 MMOL/L
CHOLEST SERPL-MCNC: 145 MG/DL
CO2 SERPL-SCNC: 28 MMOL/L
CREAT SERPL-MCNC: 0.67 MG/DL
EGFRCR SERPLBLD CKD-EPI 2021: 107 ML/MIN/1.73M2
EOSINOPHIL # BLD AUTO: 0.22 K/UL
EOSINOPHIL NFR BLD AUTO: 2.7 %
ESTIMATED AVERAGE GLUCOSE: 128 MG/DL
ESTRADIOL SERPL-MCNC: 64 PG/ML
FSH SERPL-MCNC: 2 IU/L
GLUCOSE SERPL-MCNC: 96 MG/DL
HBA1C MFR BLD HPLC: 6.1 %
HCT VFR BLD CALC: 37.2 %
HDLC SERPL-MCNC: 66 MG/DL
HGB BLD-MCNC: 11.8 G/DL
IMM GRANULOCYTES NFR BLD AUTO: 0.1 %
LDLC SERPL CALC-MCNC: 64 MG/DL
LH SERPL-ACNC: 3.2 IU/L
LYMPHOCYTES # BLD AUTO: 1.28 K/UL
LYMPHOCYTES NFR BLD AUTO: 15.4 %
MAN DIFF?: NORMAL
MCHC RBC-ENTMCNC: 27.5 PG
MCHC RBC-ENTMCNC: 31.7 G/DL
MCV RBC AUTO: 86.7 FL
MONOCYTES # BLD AUTO: 0.6 K/UL
MONOCYTES NFR BLD AUTO: 7.2 %
NEUTROPHILS # BLD AUTO: 6.14 K/UL
NEUTROPHILS NFR BLD AUTO: 74 %
NONHDLC SERPL-MCNC: 79 MG/DL
PLATELET # BLD AUTO: 455 K/UL
POTASSIUM SERPL-SCNC: 4.7 MMOL/L
PROLACTIN SERPL-MCNC: 25.6 NG/ML
PROT SERPL-MCNC: 6.6 G/DL
RBC # BLD: 4.29 M/UL
RBC # FLD: 14.6 %
SODIUM SERPL-SCNC: 137 MMOL/L
T4 FREE SERPL-MCNC: 1.2 NG/DL
THYROGLOB AB SERPL-ACNC: 16.7 IU/ML
THYROPEROXIDASE AB SERPL IA-ACNC: 18.9 IU/ML
TRIGL SERPL-MCNC: 77 MG/DL
TSH SERPL-ACNC: 1.32 UIU/ML
VIT B12 SERPL-MCNC: 744 PG/ML
WBC # FLD AUTO: 8.3 K/UL

## 2025-04-02 ENCOUNTER — APPOINTMENT (OUTPATIENT)
Dept: ENDOCRINOLOGY | Facility: CLINIC | Age: 50
End: 2025-04-02
Payer: COMMERCIAL

## 2025-04-02 PROCEDURE — 97802 MEDICAL NUTRITION INDIV IN: CPT

## 2025-04-03 ENCOUNTER — APPOINTMENT (OUTPATIENT)
Dept: INFECTIOUS DISEASE | Facility: CLINIC | Age: 50
End: 2025-04-03
Payer: COMMERCIAL

## 2025-04-03 VITALS
DIASTOLIC BLOOD PRESSURE: 74 MMHG | TEMPERATURE: 97.6 F | HEIGHT: 65.5 IN | BODY MASS INDEX: 28.15 KG/M2 | SYSTOLIC BLOOD PRESSURE: 112 MMHG | WEIGHT: 171 LBS | OXYGEN SATURATION: 98 % | HEART RATE: 77 BPM

## 2025-04-03 DIAGNOSIS — N61.1 ABSCESS OF THE BREAST AND NIPPLE: ICD-10-CM

## 2025-04-03 DIAGNOSIS — A31.8 OTHER MYCOBACTERIAL INFECTIONS: ICD-10-CM

## 2025-04-03 PROCEDURE — 99214 OFFICE O/P EST MOD 30 MIN: CPT

## 2025-04-11 ENCOUNTER — NON-APPOINTMENT (OUTPATIENT)
Age: 50
End: 2025-04-11

## 2025-04-14 ENCOUNTER — NON-APPOINTMENT (OUTPATIENT)
Age: 50
End: 2025-04-14

## 2025-05-02 ENCOUNTER — APPOINTMENT (OUTPATIENT)
Dept: INFECTIOUS DISEASE | Facility: CLINIC | Age: 50
End: 2025-05-02
Payer: COMMERCIAL

## 2025-05-02 VITALS
TEMPERATURE: 98.4 F | BODY MASS INDEX: 27.33 KG/M2 | HEIGHT: 65.5 IN | OXYGEN SATURATION: 96 % | DIASTOLIC BLOOD PRESSURE: 73 MMHG | SYSTOLIC BLOOD PRESSURE: 104 MMHG | WEIGHT: 166 LBS | HEART RATE: 78 BPM

## 2025-05-02 DIAGNOSIS — N61.1 ABSCESS OF THE BREAST AND NIPPLE: ICD-10-CM

## 2025-05-02 DIAGNOSIS — A31.8 OTHER MYCOBACTERIAL INFECTIONS: ICD-10-CM

## 2025-05-02 PROCEDURE — 99214 OFFICE O/P EST MOD 30 MIN: CPT

## 2025-05-02 RX ORDER — LINEZOLID 600 MG/1
600 TABLET, FILM COATED ORAL
Qty: 60 | Refills: 2 | Status: ACTIVE | COMMUNITY
Start: 2025-05-02 | End: 1900-01-01

## 2025-05-07 ENCOUNTER — NON-APPOINTMENT (OUTPATIENT)
Age: 50
End: 2025-05-07

## 2025-05-13 ENCOUNTER — RESULT REVIEW (OUTPATIENT)
Age: 50
End: 2025-05-13

## 2025-05-13 ENCOUNTER — NON-APPOINTMENT (OUTPATIENT)
Age: 50
End: 2025-05-13

## 2025-05-14 ENCOUNTER — OUTPATIENT (OUTPATIENT)
Dept: OUTPATIENT SERVICES | Facility: HOSPITAL | Age: 50
LOS: 1 days | End: 2025-05-14
Payer: COMMERCIAL

## 2025-05-14 ENCOUNTER — APPOINTMENT (OUTPATIENT)
Dept: MRI IMAGING | Facility: IMAGING CENTER | Age: 50
End: 2025-05-14
Payer: COMMERCIAL

## 2025-05-14 DIAGNOSIS — N61.1 ABSCESS OF THE BREAST AND NIPPLE: ICD-10-CM

## 2025-05-14 DIAGNOSIS — Z98.890 OTHER SPECIFIED POSTPROCEDURAL STATES: Chronic | ICD-10-CM

## 2025-05-14 DIAGNOSIS — Z00.8 ENCOUNTER FOR OTHER GENERAL EXAMINATION: ICD-10-CM

## 2025-05-14 PROCEDURE — A9585: CPT

## 2025-05-14 PROCEDURE — C8908: CPT

## 2025-05-14 PROCEDURE — 77049 MRI BREAST C-+ W/CAD BI: CPT | Mod: 26

## 2025-05-14 PROCEDURE — C8937: CPT

## 2025-05-21 ENCOUNTER — TRANSCRIPTION ENCOUNTER (OUTPATIENT)
Age: 50
End: 2025-05-21

## 2025-06-05 ENCOUNTER — APPOINTMENT (OUTPATIENT)
Dept: INFECTIOUS DISEASE | Facility: CLINIC | Age: 50
End: 2025-06-05

## 2025-06-18 ENCOUNTER — APPOINTMENT (OUTPATIENT)
Dept: INTERNAL MEDICINE | Facility: CLINIC | Age: 50
End: 2025-06-18

## 2025-08-01 ENCOUNTER — APPOINTMENT (OUTPATIENT)
Dept: INTERVENTIONAL RADIOLOGY/VASCULAR | Facility: CLINIC | Age: 50
End: 2025-08-01

## 2025-08-01 DIAGNOSIS — A31.8 OTHER MYCOBACTERIAL INFECTIONS: ICD-10-CM

## 2025-08-01 DIAGNOSIS — N61.1 ABSCESS OF THE BREAST AND NIPPLE: ICD-10-CM

## 2025-08-01 DIAGNOSIS — Z01.812 ENCOUNTER FOR PREPROCEDURAL LABORATORY EXAMINATION: ICD-10-CM

## 2025-08-01 RX ORDER — AMIKACIN SULFATE 250 MG/ML
INJECTION INTRAMUSCULAR; INTRAVENOUS
Refills: 0 | Status: ACTIVE | COMMUNITY

## 2025-08-01 RX ORDER — LEVOFLOXACIN 5 MG/ML
INJECTION INTRAVENOUS
Refills: 0 | Status: DISCONTINUED | COMMUNITY
End: 2025-08-01

## 2025-08-01 RX ORDER — IMIPENEM AND CILASTATIN SODIUM 500; 500 MG/20ML; MG/20ML
INJECTION, POWDER, FOR SOLUTION INTRAVENOUS
Refills: 0 | Status: ACTIVE | COMMUNITY

## 2025-08-14 ENCOUNTER — TRANSCRIPTION ENCOUNTER (OUTPATIENT)
Age: 50
End: 2025-08-14

## 2025-08-14 ENCOUNTER — OUTPATIENT (OUTPATIENT)
Dept: OUTPATIENT SERVICES | Facility: HOSPITAL | Age: 50
LOS: 1 days | End: 2025-08-14
Payer: COMMERCIAL

## 2025-08-14 VITALS
TEMPERATURE: 98 F | HEART RATE: 94 BPM | DIASTOLIC BLOOD PRESSURE: 73 MMHG | SYSTOLIC BLOOD PRESSURE: 146 MMHG | OXYGEN SATURATION: 99 %

## 2025-08-14 DIAGNOSIS — N61.1 ABSCESS OF THE BREAST AND NIPPLE: ICD-10-CM

## 2025-08-14 DIAGNOSIS — Z98.890 OTHER SPECIFIED POSTPROCEDURAL STATES: Chronic | ICD-10-CM

## 2025-08-14 DIAGNOSIS — A31.8 OTHER MYCOBACTERIAL INFECTIONS: ICD-10-CM

## 2025-08-14 LAB
GRAM STN FLD: SIGNIFICANT CHANGE UP
SPECIMEN SOURCE: SIGNIFICANT CHANGE UP

## 2025-08-14 PROCEDURE — 76942 ECHO GUIDE FOR BIOPSY: CPT | Mod: 26

## 2025-08-14 PROCEDURE — 87075 CULTR BACTERIA EXCEPT BLOOD: CPT

## 2025-08-14 PROCEDURE — 10160 PNXR ASPIR ABSC HMTMA BULLA: CPT

## 2025-08-14 PROCEDURE — 76942 ECHO GUIDE FOR BIOPSY: CPT

## 2025-08-14 PROCEDURE — 87205 SMEAR GRAM STAIN: CPT

## 2025-08-14 PROCEDURE — C1729: CPT

## 2025-08-14 PROCEDURE — 87015 SPECIMEN INFECT AGNT CONCNTJ: CPT

## 2025-08-14 PROCEDURE — 87102 FUNGUS ISOLATION CULTURE: CPT

## 2025-08-14 PROCEDURE — 87116 MYCOBACTERIA CULTURE: CPT

## 2025-08-14 PROCEDURE — 87070 CULTURE OTHR SPECIMN AEROBIC: CPT

## 2025-08-15 LAB
NIGHT BLUE STAIN TISS: SIGNIFICANT CHANGE UP
SPECIMEN SOURCE: SIGNIFICANT CHANGE UP

## 2025-08-19 ENCOUNTER — APPOINTMENT (OUTPATIENT)
Dept: INTERVENTIONAL RADIOLOGY/VASCULAR | Facility: CLINIC | Age: 50
End: 2025-08-19

## 2025-08-20 LAB
CULTURE RESULTS: SIGNIFICANT CHANGE UP
SPECIMEN SOURCE: SIGNIFICANT CHANGE UP

## 2025-08-26 ENCOUNTER — RESULT REVIEW (OUTPATIENT)
Age: 50
End: 2025-08-26

## 2025-08-26 ENCOUNTER — APPOINTMENT (OUTPATIENT)
Dept: ULTRASOUND IMAGING | Facility: IMAGING CENTER | Age: 50
End: 2025-08-26
Payer: COMMERCIAL

## 2025-08-26 ENCOUNTER — OUTPATIENT (OUTPATIENT)
Dept: OUTPATIENT SERVICES | Facility: HOSPITAL | Age: 50
LOS: 1 days | End: 2025-08-26
Payer: COMMERCIAL

## 2025-08-26 DIAGNOSIS — Z98.890 OTHER SPECIFIED POSTPROCEDURAL STATES: Chronic | ICD-10-CM

## 2025-08-26 DIAGNOSIS — N61.1 ABSCESS OF THE BREAST AND NIPPLE: ICD-10-CM

## 2025-08-26 PROCEDURE — 76641 ULTRASOUND BREAST COMPLETE: CPT

## 2025-08-26 PROCEDURE — 76641 ULTRASOUND BREAST COMPLETE: CPT | Mod: 26,50

## 2025-09-12 ENCOUNTER — APPOINTMENT (OUTPATIENT)
Dept: INTERVENTIONAL RADIOLOGY/VASCULAR | Facility: CLINIC | Age: 50
End: 2025-09-12
Payer: COMMERCIAL

## 2025-09-12 DIAGNOSIS — A31.8 OTHER MYCOBACTERIAL INFECTIONS: ICD-10-CM

## 2025-09-12 DIAGNOSIS — N61.1 ABSCESS OF THE BREAST AND NIPPLE: ICD-10-CM

## 2025-09-12 PROCEDURE — 99214 OFFICE O/P EST MOD 30 MIN: CPT | Mod: 95
